# Patient Record
Sex: FEMALE | Race: BLACK OR AFRICAN AMERICAN | NOT HISPANIC OR LATINO | Employment: PART TIME | URBAN - METROPOLITAN AREA
[De-identification: names, ages, dates, MRNs, and addresses within clinical notes are randomized per-mention and may not be internally consistent; named-entity substitution may affect disease eponyms.]

---

## 2021-03-08 PROBLEM — Z34.90 PREGNANCY: Status: ACTIVE | Noted: 2021-03-08

## 2021-03-08 PROBLEM — O23.40 UTI (URINARY TRACT INFECTION) DURING PREGNANCY: Status: ACTIVE | Noted: 2021-03-08

## 2021-03-08 PROBLEM — O21.9 NAUSEA AND VOMITING OF PREGNANCY, ANTEPARTUM: Status: ACTIVE | Noted: 2021-03-08

## 2021-09-23 PROBLEM — Z34.90 PREGNANCY: Status: RESOLVED | Noted: 2021-03-08 | Resolved: 2021-09-23

## 2023-06-29 PROBLEM — R10.9 ACUTE ABDOMINAL PAIN: Status: ACTIVE | Noted: 2023-06-29

## 2023-06-29 PROBLEM — O26.892 ABDOMINAL PAIN IN PREGNANCY, SECOND TRIMESTER: Status: ACTIVE | Noted: 2023-06-29

## 2023-06-29 PROBLEM — K59.00 CONSTIPATION IN PREGNANCY IN SECOND TRIMESTER: Status: ACTIVE | Noted: 2023-06-29

## 2023-06-29 PROBLEM — O99.012 ANEMIA IN PREGNANCY, SECOND TRIMESTER: Status: ACTIVE | Noted: 2023-06-29

## 2023-06-29 PROBLEM — R10.9 ABDOMINAL PAIN IN PREGNANCY, SECOND TRIMESTER: Status: ACTIVE | Noted: 2023-06-29

## 2023-06-29 PROBLEM — O99.612 CONSTIPATION IN PREGNANCY IN SECOND TRIMESTER: Status: ACTIVE | Noted: 2023-06-29

## 2023-07-06 ENCOUNTER — ROUTINE PRENATAL (OUTPATIENT)
Dept: OBSTETRICS AND GYNECOLOGY | Facility: CLINIC | Age: 28
End: 2023-07-06
Payer: COMMERCIAL

## 2023-07-06 VITALS — DIASTOLIC BLOOD PRESSURE: 55 MMHG | BODY MASS INDEX: 30.3 KG/M2 | WEIGHT: 140 LBS | SYSTOLIC BLOOD PRESSURE: 86 MMHG

## 2023-07-06 DIAGNOSIS — Z36.89 ENCOUNTER FOR FETAL ANATOMIC SURVEY: ICD-10-CM

## 2023-07-06 DIAGNOSIS — Z3A.18 18 WEEKS GESTATION OF PREGNANCY: Primary | ICD-10-CM

## 2023-07-06 DIAGNOSIS — Z36.86 ENCOUNTER FOR ANTENATAL SCREENING FOR CERVICAL LENGTH: ICD-10-CM

## 2023-07-06 DIAGNOSIS — K59.00 CONSTIPATION, UNSPECIFIED CONSTIPATION TYPE: ICD-10-CM

## 2023-07-06 DIAGNOSIS — Z34.92 PRENATAL CARE IN SECOND TRIMESTER: ICD-10-CM

## 2023-07-06 DIAGNOSIS — O21.9 NAUSEA AND VOMITING DURING PREGNANCY: ICD-10-CM

## 2023-07-06 PROCEDURE — 99213 OFFICE O/P EST LOW 20 MIN: CPT | Performed by: STUDENT IN AN ORGANIZED HEALTH CARE EDUCATION/TRAINING PROGRAM

## 2023-07-06 RX ORDER — POLYETHYLENE GLYCOL 3350 17 G/17G
17 POWDER, FOR SOLUTION ORAL DAILY PRN
Qty: 30 EACH | Refills: 2 | Status: SHIPPED | OUTPATIENT
Start: 2023-07-06

## 2023-07-06 NOTE — PROGRESS NOTES
Chief Complaint   Patient presents with    Routine Prenatal Visit      Ian Perez is a 28 y.o.  at 18w3d who presents for routine prenatal visit. She reports that she is still having some constipation symptoms like what brought her to the hospital but it has improved a lot. She states last bowel movement was a small amount and she has been more regular. She also has noted improvement in her nausea and vomiting.  Denies vaginal bleeding, cramping, contractions, LOF. She has started to feel some fetal movement.     BP (!) 86/55   Wt 63.5 kg (140 lb)   LMP 2023   BMI 30.30 kg/m²    Gen: well appearing, NAD   Abd: gravid, nontender  See OB Flowsheet    ASSESSMENT:   IUP at 18w3d   2.   Prenatal care in second trimester  3.   Nausea and vomiting of pregnancy- improving  4.   Constipation     PLAN:  Problem list reviewed and updated.   Reviewed expectations of this stage of pregnancy.   Second trimester precautions reviewed including  labor precautions, anticipated fetal movements.   Will obtain anatomy survey and cervical length screening at next  visit.   Prescribed Miralax daily as needed for constipation symptoms.   Return in about 4 weeks (around 8/3/2023) for prenatal visit and anatomy survey .    Patient Active Problem List    Diagnosis Date Noted    Acute abdominal pain 2023    Abdominal pain in pregnancy, second trimester 2023    Constipation in pregnancy in second trimester 2023    Anemia in pregnancy, second trimester 2023    Retained products of conception after delivery without hemorrhage 2022     Note Last Updated: 2022     Added automatically from request for surgery [id number]      Abnormal uterine bleeding, postpartum 2022     Note Last Updated: 2022     Added automatically from request for surgery [id number]      Maternal anemia in pregnancy, antepartum 09/15/2021    UTI (urinary tract infection) during pregnancy 2021    Nausea  and vomiting of pregnancy, antepartum 03/08/2021       Orders Placed This Encounter   Procedures    US Ob 14 + Weeks Single or First Gestation     Standing Status:   Future     Standing Expiration Date:   7/24/2024     Order Specific Question:   Reason for Exam:     Answer:   fetal anatomy survey     Order Specific Question:   Release to patient     Answer:   Routine Release    US Ob Transvaginal     Standing Status:   Future     Standing Expiration Date:   7/24/2024     Order Specific Question:   Reason for Exam:     Answer:   cervical length screening     Order Specific Question:   Release to patient     Answer:   Routine Release     Malorie Tucker MD

## 2023-08-09 ENCOUNTER — ROUTINE PRENATAL (OUTPATIENT)
Dept: OBSTETRICS AND GYNECOLOGY | Facility: CLINIC | Age: 28
End: 2023-08-09
Payer: COMMERCIAL

## 2023-08-09 VITALS — DIASTOLIC BLOOD PRESSURE: 59 MMHG | BODY MASS INDEX: 31.59 KG/M2 | WEIGHT: 146 LBS | SYSTOLIC BLOOD PRESSURE: 93 MMHG

## 2023-08-09 DIAGNOSIS — Z3A.23 23 WEEKS GESTATION OF PREGNANCY: Primary | ICD-10-CM

## 2023-08-09 DIAGNOSIS — Z34.80 SUPERVISION OF OTHER NORMAL PREGNANCY, ANTEPARTUM: ICD-10-CM

## 2023-08-09 DIAGNOSIS — Z34.92 PRENATAL CARE IN SECOND TRIMESTER: ICD-10-CM

## 2023-08-09 LAB
GLUCOSE UR STRIP-MCNC: NEGATIVE MG/DL
PROT UR STRIP-MCNC: ABNORMAL MG/DL

## 2023-08-09 NOTE — PROGRESS NOTES
Chief Complaint   Patient presents with    Routine Prenatal Visit      Ian Perez is a 28 y.o.  at 23w2d who presents of routine prenatal visit. She reports doing well and has no complaints today. Denies vaginal bleeding, cramping, contractions, LOF. She reports feeling active fetal movement.     BP 93/59   Wt 66.2 kg (146 lb)   LMP 2023   BMI 31.59 kg/mý    Gen: well appearing, NAD   Abd: gravid, nontender  See OB Flowsheet    ASSESSMENT:   IUP at 23w2d   Prenatal care in second trimester  Supervision of normal pregnancy    PLAN:  Problem list reviewed and updated.   Reviewed expectations of this stage of pregnancy.   Second trimester precautions reviewed including  labor precautions, anticipated fetal movements.   AFP reviewed, declines.  Discussed ultrasound that demonstrated normal fetal anatomy survey, normal cervical length screening measuring 6.05 cm,  g (52%ile, AC 70%ile), posterior placenta, breech presentation, and MVP 5.77 cm.   Will obtain CBC and 1h GTT at next visit.   Return in about 4 weeks (around 2023) for 1 h GTT, Prenatal visit.    Patient Active Problem List    Diagnosis Date Noted    Acute abdominal pain 2023    Abdominal pain in pregnancy, second trimester 2023    Constipation in pregnancy in second trimester 2023    Anemia in pregnancy, second trimester 2023    Retained products of conception after delivery without hemorrhage 2022     Note Last Updated: 2022     Added automatically from request for surgery [id number]      Abnormal uterine bleeding, postpartum 2022     Note Last Updated: 2022     Added automatically from request for surgery [id number]      Maternal anemia in pregnancy, antepartum 09/15/2021    UTI (urinary tract infection) during pregnancy 2021    Nausea and vomiting of pregnancy, antepartum 2021       Orders Placed This Encounter   Procedures    CBC (No Diff)     Standing Status:    Future     Standing Expiration Date:   8/27/2024     Order Specific Question:   Release to patient     Answer:   Routine Release [[id number]]    Gestational Screen 1 Hr ([facility])     Standing Status:   Future     Standing Expiration Date:   8/27/2024     Order Specific Question:   Release to patient     Answer:   Routine Release [[id number]]    POC Urinalysis Dipstick     Malorie Tucker MD

## 2023-08-21 ENCOUNTER — REFERRAL TRIAGE (OUTPATIENT)
Dept: LABOR AND DELIVERY | Facility: HOSPITAL | Age: 28
End: 2023-08-21
Payer: COMMERCIAL

## 2023-08-28 ENCOUNTER — PATIENT OUTREACH (OUTPATIENT)
Dept: LABOR AND DELIVERY | Facility: HOSPITAL | Age: 28
End: 2023-08-28
Payer: COMMERCIAL

## 2023-08-28 NOTE — OUTREACH NOTE
[facility]  Enrollment    Current Estimated Gestational Age: 26w0d    Questions/Answers      Flowsheet Row Responses   Would like to participate? 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 No   Resources Presently Utilizing: WIC (Women, Infant, Children)   Maternal Warning Signs Provided   Other Education Housing Assistance, Transportation Assistance, WIC Benefits, How to find a pediatrician, How to find a dentist, Insurance benefits/Incentives            Learning Assessment      Flowsheet Row Responses   Relationship Patient   Learner Name Ian   Does the learner have any barriers to learning? No Barriers   What is the preferred language of the learner for medical teaching? English   Is an  required? No  [may be needed for more indepth medical conversations]   How does the learner prefer to learn new concepts? 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 sick today   Supplies ready for baby Car Seat, Crib, Clothing   Resource/Environmental Concerns Financial   Do you have any questions related to your care experience, your pregnancy, plans for delivery, any concerns, etc? No   Other Education Housing Assistance, Transportation Assistance, WIC Benefits, How to find a pediatrician, How to find a dentist, Insurance benefits/Incentives          Intake completed today but pt is not feeling well and sounds  "congested and is coughing during phone call. She lives with her mother and  in an apartment and would like resources for housing as their current apartment \"does not have good heating\". Her mother has a car but she reports that transportation to appts can be difficult between work schedules. She does have WIC but still reports some food insecurity. She would like assistance finding a new peds office closer to their home. She does have many necessary infant items,  just not diapers. Reviewed maternal warning signs. She reports that she is feeling \"some\" fetal movement, definitely not as much today but she also reports that she has not been eating/drinking. Encouraged fluids today, she will monitor fetal movement this evening and go to L&D if indicated or make an appt tomorrow with her PCP if needed. She is not able to get into her My Chart account, will plan to bring a folder of info to her next appt.     Karena Galvin RN  Maternity Nurse Navigator    8/28/2023, 16:43 EDT                        "

## 2023-09-05 ENCOUNTER — ROUTINE PRENATAL (OUTPATIENT)
Dept: OBSTETRICS AND GYNECOLOGY | Facility: CLINIC | Age: 28
End: 2023-09-05

## 2023-09-05 ENCOUNTER — PATIENT OUTREACH (OUTPATIENT)
Dept: LABOR AND DELIVERY | Facility: HOSPITAL | Age: 28
End: 2023-09-05
Payer: COMMERCIAL

## 2023-09-05 VITALS — WEIGHT: 149 LBS | SYSTOLIC BLOOD PRESSURE: 94 MMHG | DIASTOLIC BLOOD PRESSURE: 61 MMHG | BODY MASS INDEX: 32.24 KG/M2

## 2023-09-05 DIAGNOSIS — O36.8120 DECREASED FETAL MOVEMENTS IN SECOND TRIMESTER, SINGLE OR UNSPECIFIED FETUS: ICD-10-CM

## 2023-09-05 DIAGNOSIS — Z34.80 SUPERVISION OF OTHER NORMAL PREGNANCY, ANTEPARTUM: ICD-10-CM

## 2023-09-05 DIAGNOSIS — Z3A.27 27 WEEKS GESTATION OF PREGNANCY: Primary | ICD-10-CM

## 2023-09-05 DIAGNOSIS — Z34.92 PRENATAL CARE, SECOND TRIMESTER: ICD-10-CM

## 2023-09-05 NOTE — PROGRESS NOTES
Chief Complaint   Patient presents with    Routine Prenatal Visit      Ian Perez is a 28 y.o.  at 27w1d who presents of routine prenatal visit. She reports decreased fetal movement for last 3 days. Denies vaginal bleeding, cramping, contractions, LOF.     BP 94/61   Wt 67.6 kg (149 lb)   LMP 2023   BMI 32.24 kg/m²    Gen: well appearing, NAD   Abd: gravid, nontender, fetal movement palpated on exam  See OB Flowsheet    ASSESSMENT:   IUP at 27w1d   Prenatal care in second trimester  Supervision of normal pregnancy  Decreased fetal movement in second trimester     PLAN:  Problem list reviewed and updated.   Reviewed expectations of this stage of pregnancy.   Second trimester precautions reviewed including  labor precautions, anticipated fetal movements.   NST performed today for decreased fetal movement that returned reactive. I could also feel fetal movement on examination of fetal heart rate. The patient was provided reassurance.   Collected 1h GTT and CBC today.   Follow up in 4 weeks for prenatal visit.     Patient Active Problem List    Diagnosis Date Noted    Acute abdominal pain 2023    Abdominal pain in pregnancy, second trimester 2023    Constipation in pregnancy in second trimester 2023    Anemia in pregnancy, second trimester 2023    Retained products of conception after delivery without hemorrhage 2022     Note Last Updated: 2022     Added automatically from request for surgery [id number]      Abnormal uterine bleeding, postpartum 2022     Note Last Updated: 2022     Added automatically from request for surgery [id number]      Maternal anemia in pregnancy, antepartum 09/15/2021    UTI (urinary tract infection) during pregnancy 2021    Nausea and vomiting of pregnancy, antepartum 2021       No orders of the defined types were placed in this encounter.    Malorie Tucker MD

## 2023-09-05 NOTE — OUTREACH NOTE
[facility]  Check-In    Current Estimated Gestational Age: 27w1d      Questions/Answers      Flowsheet Row Responses   Other Education HANDS, Housing Assistance, How to find a dentist, How to find a pediatrician, Insurance benefits/Incentives, Mental Health Services, Transportation Assistance, WIC Benefits          Met pt at office with folder of resources including the Helpline number for My Chart as she has an active account but cannot get in to access info. F/u in two weeks.     Kraena Galvin RN  Maternity Nurse Navigator    9/5/2023, 11:29 EDT

## 2023-09-19 ENCOUNTER — PATIENT OUTREACH (OUTPATIENT)
Dept: LABOR AND DELIVERY | Facility: HOSPITAL | Age: 28
End: 2023-09-19
Payer: COMMERCIAL

## 2023-09-19 NOTE — OUTREACH NOTE
[facility]  Check-In    Current Estimated Gestational Age: 29w1d      Questions/Answers      Flowsheet Row Responses   Best Method for Contacting Cell   Demographics Reviewed Yes   Currently Employed Yes   Able to keep appointments as scheduled Yes   Gender(s) and Name(s) girl   Baby Active/Feeling Fetal Movemen Yes   How are you presently feeling? feeling much better   Supplies ready for baby Car Seat, Crib, Clothing   Resource/Environmental Concerns Financial   Do you have any questions related to your care experience, your pregnancy, plans for delivery, any concerns, etc? No   Other Education WIC Benefits, Insurance benefits/Incentives, Housing Assistance          Pt reports that she is feeling much better and feeling more active fetal movement as well. Still needing assistance with housing and food. Reached out to [facility] because her zip code qualifies her for their food pantry and they will see if they can help with other resources once she is in the system. Send her their hours for this Friday. F/u next month.     Karena Galvin RN  Maternity Nurse Navigator    9/19/2023, 12:19 EDT

## 2023-10-05 ENCOUNTER — ROUTINE PRENATAL (OUTPATIENT)
Dept: OBSTETRICS AND GYNECOLOGY | Facility: CLINIC | Age: 28
End: 2023-10-05
Payer: COMMERCIAL

## 2023-10-05 VITALS — WEIGHT: 154 LBS | DIASTOLIC BLOOD PRESSURE: 62 MMHG | SYSTOLIC BLOOD PRESSURE: 96 MMHG | BODY MASS INDEX: 33.33 KG/M2

## 2023-10-05 DIAGNOSIS — Z3A.31 31 WEEKS GESTATION OF PREGNANCY: Primary | ICD-10-CM

## 2023-10-05 DIAGNOSIS — O26.893 GESTATIONAL DYSPNEA IN THIRD TRIMESTER: ICD-10-CM

## 2023-10-05 DIAGNOSIS — Z34.83 PRENATAL CARE, SUBSEQUENT PREGNANCY, THIRD TRIMESTER: ICD-10-CM

## 2023-10-05 DIAGNOSIS — O99.019 MATERNAL ANEMIA IN PREGNANCY, ANTEPARTUM: ICD-10-CM

## 2023-10-05 DIAGNOSIS — R06.00 GESTATIONAL DYSPNEA IN THIRD TRIMESTER: ICD-10-CM

## 2023-10-05 DIAGNOSIS — R00.2 HEART PALPITATIONS: ICD-10-CM

## 2023-10-05 RX ORDER — FAMOTIDINE 10 MG/ML
20 INJECTION, SOLUTION INTRAVENOUS ONCE
OUTPATIENT
Start: 2023-10-09

## 2023-10-05 RX ORDER — DIPHENHYDRAMINE HYDROCHLORIDE 50 MG/ML
25 INJECTION INTRAMUSCULAR; INTRAVENOUS ONCE
OUTPATIENT
Start: 2023-10-09

## 2023-10-05 RX ORDER — DIPHENHYDRAMINE HYDROCHLORIDE 50 MG/ML
50 INJECTION INTRAMUSCULAR; INTRAVENOUS AS NEEDED
OUTPATIENT
Start: 2023-10-09

## 2023-10-05 RX ORDER — ACETAMINOPHEN 325 MG/1
650 TABLET ORAL ONCE
OUTPATIENT
Start: 2023-10-09

## 2023-10-05 RX ORDER — SODIUM CHLORIDE 9 MG/ML
250 INJECTION, SOLUTION INTRAVENOUS ONCE
OUTPATIENT
Start: 2023-10-09

## 2023-10-05 RX ORDER — DIPHENHYDRAMINE HCL 25 MG
25 CAPSULE ORAL ONCE
OUTPATIENT
Start: 2023-10-09

## 2023-10-05 RX ORDER — FAMOTIDINE 10 MG/ML
20 INJECTION, SOLUTION INTRAVENOUS AS NEEDED
OUTPATIENT
Start: 2023-10-09

## 2023-10-05 RX ORDER — FAMOTIDINE 20 MG/1
20 TABLET, FILM COATED ORAL ONCE
OUTPATIENT
Start: 2023-10-09

## 2023-10-05 NOTE — PROGRESS NOTES
Chief Complaint   Patient presents with    Routine Prenatal Visit      Ian Perez is a 28 y.o.  at 31w3d who presents for routine prenatal visit. She reports that she has been experiencing shortness of breath and racing heart feeling like it is beating out of her chest almost every morning when she wakes up. She has no associated chest pain. The episodes due resolve on their own. The episodes sometimes occur at other times during the day but most of the time its when she wakes up. Denies vaginal bleeding, cramping, contractions, LOF. She reports active fetal movement.     BP 96/62   Wt 69.9 kg (154 lb)   LMP 2023   BMI 33.33 kg/m²    Gen: well appearing, NAD   Pulm: no increased work of breathing, CTAB  Card: normal rate and rhythm without murmurs, rubs, gallops   Abd: gravid, nontender  See OB Flowsheet    ASSESSMENT:   IUP at 31w3d   Prenatal care in third trimester   Maternal anemia affecting pregnancy- Hgb 8.8   Heart palpitations  Gestational dyspnea     PLAN:  Problem list reviewed and updated.   Reviewed expectations of this stage of pregnancy.   Third trimester precautions reviewed including labor signs, monitoring fetal movements.   I discussed that patient's significant anemia that appears to be microcystic and likely iron deficiency anemia. Will obtain ferritin and iron profile to evaluate for iron deficiency anemia. Given high suspicion, I discussed option for iron infusions at infusion center and that patient agrees. Will arrange for IV Venofer 300 mg Q weekly x 3 doses.   Will refer to cardiology for further evaluation of heart palpations and dyspnea. Now, this could be related to her significant anemia or from dyspnea of pregnancy. Exam returned benign today.   Return in about 2 weeks (around 10/19/2023) for Prenatal visit.    Patient Active Problem List    Diagnosis Date Noted    Acute abdominal pain 2023    Abdominal pain in pregnancy, second trimester 2023     Constipation in pregnancy in second trimester 06/29/2023    Anemia in pregnancy, second trimester 06/29/2023    Retained products of conception after delivery without hemorrhage 01/12/2022     Note Last Updated: 1/12/2022     Added automatically from request for surgery [id number]      Abnormal uterine bleeding, postpartum 01/12/2022     Note Last Updated: 1/12/2022     Added automatically from request for surgery [id number]      Maternal anemia in pregnancy, antepartum 09/15/2021    UTI (urinary tract infection) during pregnancy 03/08/2021    Nausea and vomiting of pregnancy, antepartum 03/08/2021       Orders Placed This Encounter   Procedures    Ambulatory Referral to Cardiology     Referral Priority:   Routine     Referral Type:   Consultation     Referral Reason:   Specialty Services Required     Requested Specialty:   Cardiology     Number of Visits Requested:   1    Ambulatory Referral to [facility] For Infusion Treatment     Referral Priority:   Routine     Referral Type:   Infusion     Referral Reason:   Specialty Services Required     Number of Visits Requested:   1    POC Urinalysis Dipstick     Order Specific Question:   Release to patient     Answer:   Routine Release [[id number]]     Malorie Tucker MD

## 2023-10-10 ENCOUNTER — PATIENT OUTREACH (OUTPATIENT)
Dept: LABOR AND DELIVERY | Facility: HOSPITAL | Age: 28
End: 2023-10-10
Payer: COMMERCIAL

## 2023-10-10 ENCOUNTER — TELEPHONE (OUTPATIENT)
Dept: OBSTETRICS AND GYNECOLOGY | Facility: CLINIC | Age: 28
End: 2023-10-10
Payer: COMMERCIAL

## 2023-10-10 ENCOUNTER — OFFICE VISIT (OUTPATIENT)
Age: 28
End: 2023-10-10
Payer: COMMERCIAL

## 2023-10-10 VITALS
BODY MASS INDEX: 33.01 KG/M2 | DIASTOLIC BLOOD PRESSURE: 70 MMHG | OXYGEN SATURATION: 99 % | HEIGHT: 57 IN | WEIGHT: 153 LBS | SYSTOLIC BLOOD PRESSURE: 120 MMHG | HEART RATE: 101 BPM

## 2023-10-10 DIAGNOSIS — O99.019 MATERNAL ANEMIA IN PREGNANCY, ANTEPARTUM: Primary | ICD-10-CM

## 2023-10-10 DIAGNOSIS — R00.2 PALPITATIONS: Primary | ICD-10-CM

## 2023-10-10 PROCEDURE — 1160F RVW MEDS BY RX/DR IN RCRD: CPT | Performed by: INTERNAL MEDICINE

## 2023-10-10 PROCEDURE — 1159F MED LIST DOCD IN RCRD: CPT | Performed by: INTERNAL MEDICINE

## 2023-10-10 PROCEDURE — 93000 ELECTROCARDIOGRAM COMPLETE: CPT | Performed by: INTERNAL MEDICINE

## 2023-10-10 PROCEDURE — 99204 OFFICE O/P NEW MOD 45 MIN: CPT | Performed by: INTERNAL MEDICINE

## 2023-10-10 RX ORDER — DIPHENHYDRAMINE HYDROCHLORIDE 50 MG/ML
25 INJECTION INTRAMUSCULAR; INTRAVENOUS ONCE
Status: CANCELLED | OUTPATIENT
Start: 2023-10-10

## 2023-10-10 RX ORDER — FAMOTIDINE 10 MG/ML
20 INJECTION, SOLUTION INTRAVENOUS ONCE
Status: CANCELLED | OUTPATIENT
Start: 2023-10-10

## 2023-10-10 NOTE — TELEPHONE ENCOUNTER
[facility] Scheduling called needing clarification on Venofer. Do you want medication to be taken 1 pill daily for 3 days or 1 pill weekly for 3 weeks. Scheduling  [phone]

## 2023-10-10 NOTE — OUTREACH NOTE
[facility]  Unable to Reach       Questions/Answers      Flowsheet Row Responses   Pending Outreach Prenatal Check-in   Call Attempt First   Outcome Left message   Next Call Attempt Date 10/11/23              Karena CAR - RN  Maternity Nurse Navigator    10/10/2023, 14:41 EDT

## 2023-10-12 ENCOUNTER — PATIENT OUTREACH (OUTPATIENT)
Dept: LABOR AND DELIVERY | Facility: HOSPITAL | Age: 28
End: 2023-10-12
Payer: COMMERCIAL

## 2023-10-12 NOTE — OUTREACH NOTE
[facility]  Check-In    Current Estimated Gestational Age: 32w3d      Questions/Answers      Flowsheet Row Responses   Best Method for Contacting Cell   Demographics Reviewed Yes   Currently Employed Yes   Able to keep appointments as scheduled Yes   Gender(s) and Name(s) girl   Baby Active/Feeling Fetal Movemen Yes   How are you presently feeling? reports intermittent SOB, report that MD aware, seeing cardiology   Questions regarding prenatal visits or tests to be ordered? No   Supplies ready for baby Car Seat, Clothing, Crib, Diapers   Resource/Environmental Concerns Financial   Do you have any questions related to your care experience, your pregnancy, plans for delivery, any concerns, etc? No   Other Education [facility] Benefits, Insurance benefits/Incentives          Pt doing ok, she does report intermittent SOB but is seeing cardiology and will be stopping work in the next week or two. She did visit [facility] for food assistance and will be returning monthly. They gave her some diapers but I encouraged her to go to [facility] as they can give her more each month, sent info to her. She reports active fetal movement. F/u next month.     Karena Galvin RN  Maternity Nurse Navigator    10/12/2023, 14:49 EDT

## 2023-10-13 ENCOUNTER — HOSPITAL ENCOUNTER (OUTPATIENT)
Dept: INFUSION THERAPY | Facility: HOSPITAL | Age: 28
Discharge: HOME | End: 2023-10-13
Payer: COMMERCIAL

## 2023-10-13 VITALS
SYSTOLIC BLOOD PRESSURE: 105 MMHG | TEMPERATURE: 96.8 F | DIASTOLIC BLOOD PRESSURE: 73 MMHG | RESPIRATION RATE: 18 BRPM | OXYGEN SATURATION: 100 % | HEART RATE: 99 BPM

## 2023-10-13 DIAGNOSIS — O99.019 MATERNAL ANEMIA IN PREGNANCY, ANTEPARTUM: Primary | ICD-10-CM

## 2023-10-13 PROCEDURE — 25010000002 IRON SUCROSE PER 1 MG: Performed by: STUDENT IN AN ORGANIZED HEALTH CARE EDUCATION/TRAINING PROGRAM

## 2023-10-13 PROCEDURE — 96365 THER/PROPH/DIAG IV INF INIT: CPT

## 2023-10-13 PROCEDURE — 96366 THER/PROPH/DIAG IV INF ADDON: CPT

## 2023-10-13 PROCEDURE — 63710000001 DIPHENHYDRAMINE PER 50 MG: Performed by: STUDENT IN AN ORGANIZED HEALTH CARE EDUCATION/TRAINING PROGRAM

## 2023-10-13 PROCEDURE — 25810000003 SODIUM CHLORIDE 0.9 % SOLUTION 250 ML FLEX CONT: Performed by: STUDENT IN AN ORGANIZED HEALTH CARE EDUCATION/TRAINING PROGRAM

## 2023-10-13 RX ORDER — DIPHENHYDRAMINE HCL 25 MG
25 CAPSULE ORAL ONCE
Status: CANCELLED | OUTPATIENT
Start: 2023-10-20

## 2023-10-13 RX ORDER — FAMOTIDINE 20 MG/1
20 TABLET, FILM COATED ORAL ONCE
Status: CANCELLED | OUTPATIENT
Start: 2023-10-20

## 2023-10-13 RX ORDER — DIPHENHYDRAMINE HCL 25 MG
25 CAPSULE ORAL ONCE
Status: COMPLETED | OUTPATIENT
Start: 2023-10-13 | End: 2023-10-13

## 2023-10-13 RX ORDER — DIPHENHYDRAMINE HYDROCHLORIDE 50 MG/ML
25 INJECTION INTRAMUSCULAR; INTRAVENOUS ONCE
OUTPATIENT
Start: 2023-10-20

## 2023-10-13 RX ORDER — FAMOTIDINE 10 MG/ML
20 INJECTION, SOLUTION INTRAVENOUS AS NEEDED
Status: DISCONTINUED | OUTPATIENT
Start: 2023-10-13 | End: 2023-10-15 | Stop reason: HOSPADM

## 2023-10-13 RX ORDER — FAMOTIDINE 10 MG/ML
20 INJECTION, SOLUTION INTRAVENOUS ONCE
OUTPATIENT
Start: 2023-10-20

## 2023-10-13 RX ORDER — ACETAMINOPHEN 325 MG/1
650 TABLET ORAL ONCE
Status: COMPLETED | OUTPATIENT
Start: 2023-10-13 | End: 2023-10-13

## 2023-10-13 RX ORDER — ACETAMINOPHEN 325 MG/1
650 TABLET ORAL ONCE
Status: CANCELLED | OUTPATIENT
Start: 2023-10-20

## 2023-10-13 RX ORDER — SODIUM CHLORIDE 9 MG/ML
250 INJECTION, SOLUTION INTRAVENOUS ONCE
Status: DISCONTINUED | OUTPATIENT
Start: 2023-10-13 | End: 2023-10-15 | Stop reason: HOSPADM

## 2023-10-13 RX ORDER — SODIUM CHLORIDE 9 MG/ML
250 INJECTION, SOLUTION INTRAVENOUS ONCE
Status: CANCELLED | OUTPATIENT
Start: 2023-10-20

## 2023-10-13 RX ORDER — DIPHENHYDRAMINE HYDROCHLORIDE 50 MG/ML
50 INJECTION INTRAMUSCULAR; INTRAVENOUS AS NEEDED
Status: CANCELLED | OUTPATIENT
Start: 2023-10-20

## 2023-10-13 RX ORDER — FAMOTIDINE 20 MG/1
20 TABLET, FILM COATED ORAL ONCE
Status: COMPLETED | OUTPATIENT
Start: 2023-10-13 | End: 2023-10-13

## 2023-10-13 RX ORDER — DIPHENHYDRAMINE HYDROCHLORIDE 50 MG/ML
50 INJECTION INTRAMUSCULAR; INTRAVENOUS AS NEEDED
Status: DISCONTINUED | OUTPATIENT
Start: 2023-10-13 | End: 2023-10-15 | Stop reason: HOSPADM

## 2023-10-13 RX ORDER — FAMOTIDINE 10 MG/ML
20 INJECTION, SOLUTION INTRAVENOUS AS NEEDED
Status: CANCELLED | OUTPATIENT
Start: 2023-10-20

## 2023-10-13 RX ADMIN — DIPHENHYDRAMINE HYDROCHLORIDE 25 MG: 25 CAPSULE ORAL at 11:23

## 2023-10-13 RX ADMIN — IRON SUCROSE 300 MG: 20 INJECTION, SOLUTION INTRAVENOUS at 11:32

## 2023-10-13 RX ADMIN — ACETAMINOPHEN 650 MG: 325 TABLET, FILM COATED ORAL at 11:22

## 2023-10-13 RX ADMIN — FAMOTIDINE 20 MG: 20 TABLET ORAL at 11:23

## 2023-10-20 ENCOUNTER — APPOINTMENT (OUTPATIENT)
Dept: GENERAL RADIOLOGY | Facility: HOSPITAL | Age: 28
End: 2023-10-20
Payer: COMMERCIAL

## 2023-10-20 ENCOUNTER — HOSPITAL ENCOUNTER (OUTPATIENT)
Dept: INFUSION THERAPY | Facility: HOSPITAL | Age: 28
Discharge: HOME | End: 2023-10-20
Payer: COMMERCIAL

## 2023-10-20 ENCOUNTER — APPOINTMENT (OUTPATIENT)
Dept: CT IMAGING | Facility: HOSPITAL | Age: 28
End: 2023-10-20
Payer: COMMERCIAL

## 2023-10-20 ENCOUNTER — HOSPITAL ENCOUNTER (EMERGENCY)
Facility: HOSPITAL | Age: 28
Discharge: HOME | End: 2023-10-20
Attending: STUDENT IN AN ORGANIZED HEALTH CARE EDUCATION/TRAINING PROGRAM
Payer: COMMERCIAL

## 2023-10-20 VITALS
TEMPERATURE: 96.9 F | RESPIRATION RATE: 20 BRPM | DIASTOLIC BLOOD PRESSURE: 56 MMHG | SYSTOLIC BLOOD PRESSURE: 89 MMHG | HEART RATE: 101 BPM | OXYGEN SATURATION: 100 %

## 2023-10-20 VITALS
HEART RATE: 95 BPM | TEMPERATURE: 97.9 F | SYSTOLIC BLOOD PRESSURE: 102 MMHG | WEIGHT: 153 LBS | HEIGHT: 57 IN | BODY MASS INDEX: 33.01 KG/M2 | OXYGEN SATURATION: 96 % | DIASTOLIC BLOOD PRESSURE: 64 MMHG | RESPIRATION RATE: 16 BRPM

## 2023-10-20 DIAGNOSIS — R82.81 PYURIA: ICD-10-CM

## 2023-10-20 DIAGNOSIS — D50.9 IRON DEFICIENCY ANEMIA, UNSPECIFIED IRON DEFICIENCY ANEMIA TYPE: ICD-10-CM

## 2023-10-20 DIAGNOSIS — R07.9 LEFT-SIDED CHEST PAIN: Primary | ICD-10-CM

## 2023-10-20 DIAGNOSIS — O99.019 MATERNAL ANEMIA IN PREGNANCY, ANTEPARTUM: Primary | ICD-10-CM

## 2023-10-20 DIAGNOSIS — N76.0 ACUTE VAGINITIS: ICD-10-CM

## 2023-10-20 LAB
ABO GROUP BLD: NORMAL
ALBUMIN SERPL-MCNC: 2.9 G/DL (ref 3.5–5.2)
ALBUMIN/GLOB SERPL: 1.1 G/DL
ALP SERPL-CCNC: 86 U/L (ref 39–117)
ALT SERPL W P-5'-P-CCNC: 5 U/L (ref 1–33)
ANION GAP SERPL CALCULATED.3IONS-SCNC: 6.8 MMOL/L (ref 5–15)
APTT PPP: 27.1 SECONDS (ref 22.7–35.4)
AST SERPL-CCNC: 10 U/L (ref 1–32)
BACTERIA UR QL AUTO: ABNORMAL /HPF
BASOPHILS # BLD AUTO: 0.01 10*3/MM3 (ref 0–0.2)
BASOPHILS NFR BLD AUTO: 0.2 % (ref 0–1.5)
BILIRUB SERPL-MCNC: 0.3 MG/DL (ref 0–1.2)
BILIRUB UR QL STRIP: NEGATIVE
BLD GP AB SCN SERPL QL: NEGATIVE
BUN SERPL-MCNC: 3 MG/DL (ref 6–20)
BUN/CREAT SERPL: 10.3 (ref 7–25)
CALCIUM SPEC-SCNC: 8.1 MG/DL (ref 8.6–10.5)
CHLORIDE SERPL-SCNC: 109 MMOL/L (ref 98–107)
CLARITY UR: CLEAR
CLUE CELLS SPEC QL WET PREP: ABNORMAL
CO2 SERPL-SCNC: 20.2 MMOL/L (ref 22–29)
COLOR UR: YELLOW
CREAT SERPL-MCNC: 0.29 MG/DL (ref 0.57–1)
DEPRECATED RDW RBC AUTO: 43.1 FL (ref 37–54)
EGFRCR SERPLBLD CKD-EPI 2021: 149.6 ML/MIN/1.73
EOSINOPHIL # BLD AUTO: 0.12 10*3/MM3 (ref 0–0.4)
EOSINOPHIL NFR BLD AUTO: 1.9 % (ref 0.3–6.2)
ERYTHROCYTE [DISTWIDTH] IN BLOOD BY AUTOMATED COUNT: 15.3 % (ref 12.3–15.4)
GLOBULIN UR ELPH-MCNC: 2.7 GM/DL
GLUCOSE SERPL-MCNC: 101 MG/DL (ref 65–99)
GLUCOSE UR STRIP-MCNC: NEGATIVE MG/DL
HCT VFR BLD AUTO: 26 % (ref 34–46.6)
HGB BLD-MCNC: 8.5 G/DL (ref 12–15.9)
HGB UR QL STRIP.AUTO: NEGATIVE
HYALINE CASTS UR QL AUTO: ABNORMAL /LPF
HYDATID CYST SPEC WET PREP: ABNORMAL
IMM GRANULOCYTES # BLD AUTO: 0.16 10*3/MM3 (ref 0–0.05)
IMM GRANULOCYTES NFR BLD AUTO: 2.5 % (ref 0–0.5)
INR PPP: 1.05 (ref 0.9–1.1)
KETONES UR QL STRIP: NEGATIVE
KOH PREP NAIL: NORMAL
LEUKOCYTE ESTERASE UR QL STRIP.AUTO: ABNORMAL
LYMPHOCYTES # BLD AUTO: 1.45 10*3/MM3 (ref 0.7–3.1)
LYMPHOCYTES NFR BLD AUTO: 22.7 % (ref 19.6–45.3)
MCH RBC QN AUTO: 26.5 PG (ref 26.6–33)
MCHC RBC AUTO-ENTMCNC: 32.7 G/DL (ref 31.5–35.7)
MCV RBC AUTO: 81 FL (ref 79–97)
MONOCYTES # BLD AUTO: 0.55 10*3/MM3 (ref 0.1–0.9)
MONOCYTES NFR BLD AUTO: 8.6 % (ref 5–12)
NEUTROPHILS NFR BLD AUTO: 4.1 10*3/MM3 (ref 1.7–7)
NEUTROPHILS NFR BLD AUTO: 64.1 % (ref 42.7–76)
NITRITE UR QL STRIP: NEGATIVE
NRBC BLD AUTO-RTO: 0.2 /100 WBC (ref 0–0.2)
PH UR STRIP.AUTO: 7.5 [PH] (ref 5–8)
PLATELET # BLD AUTO: 175 10*3/MM3 (ref 140–450)
PMV BLD AUTO: 11 FL (ref 6–12)
POTASSIUM SERPL-SCNC: 3.9 MMOL/L (ref 3.5–5.2)
PROT SERPL-MCNC: 5.6 G/DL (ref 6–8.5)
PROT UR QL STRIP: NEGATIVE
PROTHROMBIN TIME: 13.8 SECONDS (ref 11.7–14.2)
QT INTERVAL: 333 MS
QTC INTERVAL: 399 MS
RBC # BLD AUTO: 3.21 10*6/MM3 (ref 3.77–5.28)
RBC # UR STRIP: ABNORMAL /HPF
REF LAB TEST METHOD: ABNORMAL
RH BLD: POSITIVE
SODIUM SERPL-SCNC: 136 MMOL/L (ref 136–145)
SP GR UR STRIP: <1.005 (ref 1–1.03)
SQUAMOUS #/AREA URNS HPF: ABNORMAL /HPF
T VAGINALIS SPEC QL WET PREP: ABNORMAL
T&S EXPIRATION DATE: NORMAL
TROPONIN T SERPL HS-MCNC: <6 NG/L
UROBILINOGEN UR QL STRIP: ABNORMAL
WBC # UR STRIP: ABNORMAL /HPF
WBC NRBC COR # BLD: 6.39 10*3/MM3 (ref 3.4–10.8)
WBC SPEC QL WET PREP: ABNORMAL
YEAST GENITAL QL WET PREP: ABNORMAL

## 2023-10-20 PROCEDURE — 25810000003 SODIUM CHLORIDE 0.9 % SOLUTION 250 ML FLEX CONT: Performed by: STUDENT IN AN ORGANIZED HEALTH CARE EDUCATION/TRAINING PROGRAM

## 2023-10-20 PROCEDURE — 93010 ELECTROCARDIOGRAM REPORT: CPT | Performed by: INTERNAL MEDICINE

## 2023-10-20 PROCEDURE — 25010000002 IRON SUCROSE PER 1 MG: Performed by: STUDENT IN AN ORGANIZED HEALTH CARE EDUCATION/TRAINING PROGRAM

## 2023-10-20 PROCEDURE — 71275 CT ANGIOGRAPHY CHEST: CPT

## 2023-10-20 PROCEDURE — 99285 EMERGENCY DEPT VISIT HI MDM: CPT

## 2023-10-20 PROCEDURE — 93005 ELECTROCARDIOGRAM TRACING: CPT | Performed by: STUDENT IN AN ORGANIZED HEALTH CARE EDUCATION/TRAINING PROGRAM

## 2023-10-20 PROCEDURE — 80053 COMPREHEN METABOLIC PANEL: CPT | Performed by: STUDENT IN AN ORGANIZED HEALTH CARE EDUCATION/TRAINING PROGRAM

## 2023-10-20 PROCEDURE — 85025 COMPLETE CBC W/AUTO DIFF WBC: CPT | Performed by: STUDENT IN AN ORGANIZED HEALTH CARE EDUCATION/TRAINING PROGRAM

## 2023-10-20 PROCEDURE — 84484 ASSAY OF TROPONIN QUANT: CPT | Performed by: STUDENT IN AN ORGANIZED HEALTH CARE EDUCATION/TRAINING PROGRAM

## 2023-10-20 PROCEDURE — 96366 THER/PROPH/DIAG IV INF ADDON: CPT

## 2023-10-20 PROCEDURE — 86901 BLOOD TYPING SEROLOGIC RH(D): CPT | Performed by: STUDENT IN AN ORGANIZED HEALTH CARE EDUCATION/TRAINING PROGRAM

## 2023-10-20 PROCEDURE — 85610 PROTHROMBIN TIME: CPT | Performed by: STUDENT IN AN ORGANIZED HEALTH CARE EDUCATION/TRAINING PROGRAM

## 2023-10-20 PROCEDURE — 87491 CHLMYD TRACH DNA AMP PROBE: CPT | Performed by: STUDENT IN AN ORGANIZED HEALTH CARE EDUCATION/TRAINING PROGRAM

## 2023-10-20 PROCEDURE — 87220 TISSUE EXAM FOR FUNGI: CPT | Performed by: STUDENT IN AN ORGANIZED HEALTH CARE EDUCATION/TRAINING PROGRAM

## 2023-10-20 PROCEDURE — 63710000001 DIPHENHYDRAMINE PER 50 MG: Performed by: STUDENT IN AN ORGANIZED HEALTH CARE EDUCATION/TRAINING PROGRAM

## 2023-10-20 PROCEDURE — 85730 THROMBOPLASTIN TIME PARTIAL: CPT | Performed by: STUDENT IN AN ORGANIZED HEALTH CARE EDUCATION/TRAINING PROGRAM

## 2023-10-20 PROCEDURE — 25510000001 IOPAMIDOL PER 1 ML: Performed by: STUDENT IN AN ORGANIZED HEALTH CARE EDUCATION/TRAINING PROGRAM

## 2023-10-20 PROCEDURE — 86900 BLOOD TYPING SEROLOGIC ABO: CPT | Performed by: STUDENT IN AN ORGANIZED HEALTH CARE EDUCATION/TRAINING PROGRAM

## 2023-10-20 PROCEDURE — 81001 URINALYSIS AUTO W/SCOPE: CPT | Performed by: STUDENT IN AN ORGANIZED HEALTH CARE EDUCATION/TRAINING PROGRAM

## 2023-10-20 PROCEDURE — 87591 N.GONORRHOEAE DNA AMP PROB: CPT | Performed by: STUDENT IN AN ORGANIZED HEALTH CARE EDUCATION/TRAINING PROGRAM

## 2023-10-20 PROCEDURE — 96365 THER/PROPH/DIAG IV INF INIT: CPT

## 2023-10-20 PROCEDURE — G0463 HOSPITAL OUTPT CLINIC VISIT: HCPCS

## 2023-10-20 PROCEDURE — 87210 SMEAR WET MOUNT SALINE/INK: CPT | Performed by: STUDENT IN AN ORGANIZED HEALTH CARE EDUCATION/TRAINING PROGRAM

## 2023-10-20 PROCEDURE — 86850 RBC ANTIBODY SCREEN: CPT | Performed by: STUDENT IN AN ORGANIZED HEALTH CARE EDUCATION/TRAINING PROGRAM

## 2023-10-20 RX ORDER — ACETAMINOPHEN 325 MG/1
650 TABLET ORAL ONCE
Status: COMPLETED | OUTPATIENT
Start: 2023-10-20 | End: 2023-10-20

## 2023-10-20 RX ORDER — FAMOTIDINE 20 MG/1
20 TABLET, FILM COATED ORAL ONCE
Status: COMPLETED | OUTPATIENT
Start: 2023-10-20 | End: 2023-10-20

## 2023-10-20 RX ORDER — ACETAMINOPHEN 325 MG/1
650 TABLET ORAL ONCE
Status: CANCELLED | OUTPATIENT
Start: 2023-10-27

## 2023-10-20 RX ORDER — FAMOTIDINE 20 MG/1
20 TABLET, FILM COATED ORAL ONCE
Status: CANCELLED | OUTPATIENT
Start: 2023-10-27

## 2023-10-20 RX ORDER — CEPHALEXIN 500 MG/1
500 CAPSULE ORAL 2 TIMES DAILY
Qty: 10 CAPSULE | Refills: 0 | Status: SHIPPED | OUTPATIENT
Start: 2023-10-20

## 2023-10-20 RX ORDER — DIPHENHYDRAMINE HYDROCHLORIDE 50 MG/ML
25 INJECTION INTRAMUSCULAR; INTRAVENOUS ONCE
Status: CANCELLED | OUTPATIENT
Start: 2023-10-27

## 2023-10-20 RX ORDER — DIPHENHYDRAMINE HCL 25 MG
25 CAPSULE ORAL ONCE
Status: CANCELLED | OUTPATIENT
Start: 2023-10-27

## 2023-10-20 RX ORDER — LIDOCAINE 50 MG/G
1 PATCH TOPICAL EVERY 24 HOURS
Qty: 30 EACH | Refills: 0 | Status: SHIPPED | OUTPATIENT
Start: 2023-10-20

## 2023-10-20 RX ORDER — SODIUM CHLORIDE 9 MG/ML
250 INJECTION, SOLUTION INTRAVENOUS ONCE
OUTPATIENT
Start: 2023-10-27

## 2023-10-20 RX ORDER — DIPHENHYDRAMINE HCL 25 MG
25 CAPSULE ORAL ONCE
Status: COMPLETED | OUTPATIENT
Start: 2023-10-20 | End: 2023-10-20

## 2023-10-20 RX ORDER — FAMOTIDINE 10 MG/ML
20 INJECTION, SOLUTION INTRAVENOUS AS NEEDED
Status: CANCELLED | OUTPATIENT
Start: 2023-10-27

## 2023-10-20 RX ORDER — CEPHALEXIN 500 MG/1
500 CAPSULE ORAL ONCE
Status: COMPLETED | OUTPATIENT
Start: 2023-10-20 | End: 2023-10-20

## 2023-10-20 RX ORDER — FAMOTIDINE 10 MG/ML
20 INJECTION, SOLUTION INTRAVENOUS ONCE
Status: CANCELLED | OUTPATIENT
Start: 2023-10-27

## 2023-10-20 RX ORDER — LIDOCAINE 50 MG/G
1 PATCH TOPICAL
Status: DISCONTINUED | OUTPATIENT
Start: 2023-10-21 | End: 2023-10-20

## 2023-10-20 RX ORDER — SODIUM CHLORIDE 9 MG/ML
250 INJECTION, SOLUTION INTRAVENOUS ONCE
Status: DISCONTINUED | OUTPATIENT
Start: 2023-10-20 | End: 2023-10-22 | Stop reason: HOSPADM

## 2023-10-20 RX ORDER — ACETAMINOPHEN 500 MG
1000 TABLET ORAL ONCE
Status: DISCONTINUED | OUTPATIENT
Start: 2023-10-20 | End: 2023-10-21 | Stop reason: HOSPADM

## 2023-10-20 RX ORDER — ACETAMINOPHEN 500 MG
500 TABLET ORAL EVERY 6 HOURS PRN
Qty: 50 TABLET | Refills: 0 | Status: SHIPPED | OUTPATIENT
Start: 2023-10-20

## 2023-10-20 RX ORDER — DIPHENHYDRAMINE HYDROCHLORIDE 50 MG/ML
50 INJECTION INTRAMUSCULAR; INTRAVENOUS AS NEEDED
Status: CANCELLED | OUTPATIENT
Start: 2023-10-27

## 2023-10-20 RX ORDER — DIPHENHYDRAMINE HYDROCHLORIDE 50 MG/ML
50 INJECTION INTRAMUSCULAR; INTRAVENOUS AS NEEDED
Status: DISCONTINUED | OUTPATIENT
Start: 2023-10-20 | End: 2023-10-22 | Stop reason: HOSPADM

## 2023-10-20 RX ORDER — LIDOCAINE 50 MG/G
1 PATCH TOPICAL
Status: DISCONTINUED | OUTPATIENT
Start: 2023-10-20 | End: 2023-10-21 | Stop reason: HOSPADM

## 2023-10-20 RX ORDER — FAMOTIDINE 10 MG/ML
20 INJECTION, SOLUTION INTRAVENOUS AS NEEDED
Status: DISCONTINUED | OUTPATIENT
Start: 2023-10-20 | End: 2023-10-22 | Stop reason: HOSPADM

## 2023-10-20 RX ADMIN — IOPAMIDOL 95 ML: 755 INJECTION, SOLUTION INTRAVENOUS at 18:26

## 2023-10-20 RX ADMIN — FAMOTIDINE 20 MG: 20 TABLET, FILM COATED ORAL at 14:04

## 2023-10-20 RX ADMIN — DIPHENHYDRAMINE HYDROCHLORIDE 25 MG: 25 CAPSULE ORAL at 14:04

## 2023-10-20 RX ADMIN — CEPHALEXIN 500 MG: 500 CAPSULE ORAL at 21:02

## 2023-10-20 RX ADMIN — ACETAMINOPHEN 650 MG: 325 TABLET, FILM COATED ORAL at 14:04

## 2023-10-20 RX ADMIN — IRON SUCROSE 300 MG: 20 INJECTION, SOLUTION INTRAVENOUS at 21:05

## 2023-10-20 NOTE — PROGRESS NOTES
Pt to ACU. When asked if she tolerated Venofer last week, she states she had nausea and a headache for 2 day after. PT is noted to be wincing frequently and states she has been having low back pain for 2 weeks. Repositioned in bed to lay on left side and pt had episode of SOA that recovered quickly. Pt crying.  States she has been having these episode frequently for 1-2 months and saw a cardiologist last week. Had a holter monitor done last week and is scheduled for an echo next week. Pre-meds given. Chart continued to be reviewed with pt and pt continued to look lethargic and crying at times. Asked pt if she would like nurse to call MD and she states yes. Dr Eason on call for Dr Tucker. Dr Eason wants to hold venofer and want pt to be evaluated in ED to rule out PE or CV incident. ED triage called and report given to Bianca. Pt taken to ED per joyce and placed in ED bay per Bianca.

## 2023-10-20 NOTE — ED TRIAGE NOTES
Pt was in ACU.  She was getting her 2nd venofer (she did not get dose).  She is 33 weeks pregnant.  She has been soa x 1-2 months.  She has been seen by cardiology - she has worn a holter that showed episodes of tachycardia.  She has an echo next week.      She was already given tylenol, benadryl and pepcid as premeds in ACU

## 2023-10-21 NOTE — ED PROVIDER NOTES
EMERGENCY DEPARTMENT ENCOUNTER    Room Number:  26/26  PCP: Provider, No Known  History obtained from: Patient      HPI:  Chief Complaint: Left chest pain  A complete HPI/ROS/PMH/PSH/SH/FH are unobtainable due to: Not applicable  Context: Ian Perez is a 28 y.o. female who presents to the ED c/o left-sided chest pain.  Ongoing for the last several days.  Worse with breathing and movement.  Currently pregnant.  No other recent illness, fever, chills.  Scheduled for iron infusion today but was transferred to the ER for further evaluation of her chest pain.  Does have some vaginal discharge and pain.            PAST MEDICAL HISTORY  Active Ambulatory Problems     Diagnosis Date Noted    UTI (urinary tract infection) during pregnancy 03/08/2021    Nausea and vomiting of pregnancy, antepartum 03/08/2021    Maternal anemia in pregnancy, antepartum 09/15/2021    Retained products of conception after delivery without hemorrhage 01/12/2022    Abnormal uterine bleeding, postpartum 01/12/2022    Acute abdominal pain 06/29/2023    Abdominal pain in pregnancy, second trimester 06/29/2023    Constipation in pregnancy in second trimester 06/29/2023    Anemia in pregnancy, second trimester 06/29/2023    Palpitations 10/10/2023     Resolved Ambulatory Problems     Diagnosis Date Noted    Pregnancy 03/08/2021     Past Medical History:   Diagnosis Date    Anemia          PAST SURGICAL HISTORY  No past surgical history on file.      FAMILY HISTORY  No family history on file.      SOCIAL HISTORY  Social History     Socioeconomic History    Marital status:      Spouse name: Tru Rose    Number of children: 1    Highest education level: Some college, no degree   Tobacco Use    Smoking status: Never     Passive exposure: Never    Smokeless tobacco: Never   Vaping Use    Vaping Use: Never used   Substance and Sexual Activity    Alcohol use: Not Currently    Drug use: Never    Sexual activity: Yes     Partners: Male          ALLERGIES  Patient has no known allergies.        REVIEW OF SYSTEMS    As per HPI      PHYSICAL EXAM  ED Triage Vitals   Temp Heart Rate Resp BP SpO2   10/20/23 1450 10/20/23 1450 10/20/23 1450 10/20/23 1519 10/20/23 1450   97.9 °F (36.6 °C) 105 16 (!) 85/64 96 %      Temp src Heart Rate Source Patient Position BP Location FiO2 (%)   10/20/23 1450 10/20/23 1450 10/20/23 1519 10/20/23 1519 --   Tympanic Monitor Lying Right arm        Physical Exam  Constitutional:       General: She is not in acute distress.  HENT:      Head: Normocephalic and atraumatic.   Cardiovascular:      Rate and Rhythm: Regular rhythm. Tachycardia present.   Pulmonary:      Effort: Pulmonary effort is normal. No respiratory distress.   Abdominal:      General: There is no distension.      Palpations: Abdomen is soft.      Tenderness: There is no abdominal tenderness.   Genitourinary:     General: Normal vulva.      Vagina: Vaginal discharge present.      Comments: Scant white discharge  Musculoskeletal:         General: No swelling or deformity.   Skin:     General: Skin is warm and dry.   Neurological:      Mental Status: She is alert. Mental status is at baseline.           Vital signs and nursing notes reviewed.          LAB RESULTS  Recent Results (from the past 24 hour(s))   Comprehensive Metabolic Panel    Collection Time: 10/20/23  3:26 PM    Specimen: Blood   Result Value Ref Range    Glucose 101 (H) 65 - 99 mg/dL    BUN 3 (L) 6 - 20 mg/dL    Creatinine 0.29 (L) 0.57 - 1.00 mg/dL    Sodium 136 136 - 145 mmol/L    Potassium 3.9 3.5 - 5.2 mmol/L    Chloride 109 (H) 98 - 107 mmol/L    CO2 20.2 (L) 22.0 - 29.0 mmol/L    Calcium 8.1 (L) 8.6 - 10.5 mg/dL    Total Protein 5.6 (L) 6.0 - 8.5 g/dL    Albumin 2.9 (L) 3.5 - 5.2 g/dL    ALT (SGPT) 5 1 - 33 U/L    AST (SGOT) 10 1 - 32 U/L    Alkaline Phosphatase 86 39 - 117 U/L    Total Bilirubin 0.3 0.0 - 1.2 mg/dL    Globulin 2.7 gm/dL    A/G Ratio 1.1 g/dL    BUN/Creatinine Ratio 10.3  7.0 - 25.0    Anion Gap 6.8 5.0 - 15.0 mmol/L    eGFR 149.6 >60.0 mL/min/1.73   Protime-INR    Collection Time: 10/20/23  3:26 PM    Specimen: Blood   Result Value Ref Range    Protime 13.8 11.7 - 14.2 Seconds    INR 1.05 0.90 - 1.10   aPTT    Collection Time: 10/20/23  3:26 PM    Specimen: Blood   Result Value Ref Range    PTT 27.1 22.7 - 35.4 seconds   Type & Screen    Collection Time: 10/20/23  3:26 PM    Specimen: Blood   Result Value Ref Range    ABO Type A     RH type Positive     Antibody Screen Negative     T&S Expiration Date 10/23/2023 11:59:59 PM    Single High Sensitivity Troponin T    Collection Time: 10/20/23  3:26 PM    Specimen: Blood   Result Value Ref Range    HS Troponin T <6 <10 ng/L   CBC Auto Differential    Collection Time: 10/20/23  3:26 PM    Specimen: Blood   Result Value Ref Range    WBC 6.39 3.40 - 10.80 10*3/mm3    RBC 3.21 (L) 3.77 - 5.28 10*6/mm3    Hemoglobin 8.5 (L) 12.0 - 15.9 g/dL    Hematocrit 26.0 (L) 34.0 - 46.6 %    MCV 81.0 79.0 - 97.0 fL    MCH 26.5 (L) 26.6 - 33.0 pg    MCHC 32.7 31.5 - 35.7 g/dL    RDW 15.3 12.3 - 15.4 %    RDW-SD 43.1 37.0 - 54.0 fl    MPV 11.0 6.0 - 12.0 fL    Platelets 175 140 - 450 10*3/mm3    Neutrophil % 64.1 42.7 - 76.0 %    Lymphocyte % 22.7 19.6 - 45.3 %    Monocyte % 8.6 5.0 - 12.0 %    Eosinophil % 1.9 0.3 - 6.2 %    Basophil % 0.2 0.0 - 1.5 %    Immature Grans % 2.5 (H) 0.0 - 0.5 %    Neutrophils, Absolute 4.10 1.70 - 7.00 10*3/mm3    Lymphocytes, Absolute 1.45 0.70 - 3.10 10*3/mm3    Monocytes, Absolute 0.55 0.10 - 0.90 10*3/mm3    Eosinophils, Absolute 0.12 0.00 - 0.40 10*3/mm3    Basophils, Absolute 0.01 0.00 - 0.20 10*3/mm3    Immature Grans, Absolute 0.16 (H) 0.00 - 0.05 10*3/mm3    nRBC 0.2 0.0 - 0.2 /100 WBC   ECG 12 Lead Chest Pain    Collection Time: 10/20/23  3:37 PM   Result Value Ref Range    QT Interval 333 ms    QTC Interval 399 ms   Urinalysis With Culture If Indicated - Urine, Clean Catch    Collection Time: 10/20/23  4:57 PM     Specimen: Urine, Clean Catch   Result Value Ref Range    Color, UA Yellow Yellow, Straw    Appearance, UA Clear Clear    pH, UA 7.5 5.0 - 8.0    Specific Gravity, UA <1.005 (L) 1.005 - 1.030    Glucose, UA Negative Negative    Ketones, UA Negative Negative    Bilirubin, UA Negative Negative    Blood, UA Negative Negative    Protein, UA Negative Negative    Leuk Esterase, UA Moderate (2+) (A) Negative    Nitrite, UA Negative Negative    Urobilinogen, UA 0.2 E.U./dL 0.2 - 1.0 E.U./dL   Urinalysis, Microscopic Only - Urine, Clean Catch    Collection Time: 10/20/23  4:57 PM    Specimen: Urine, Clean Catch   Result Value Ref Range    RBC, UA 0-2 None Seen, 0-2 /HPF    WBC, UA 3-5 (A) None Seen, 0-2 /HPF    Bacteria, UA 1+ (A) None Seen /HPF    Squamous Epithelial Cells, UA 7-12 (A) None Seen, 0-2 /HPF    Hyaline Casts, UA None Seen None Seen /LPF    Methodology Manual Light Microscopy    Wet Prep, Genital - Swab, Vagina    Collection Time: 10/20/23  7:08 PM    Specimen: Vagina; Swab   Result Value Ref Range    YEAST No yeast seen No yeast seen    HYPHAL ELEMENTS No Hyphal elements seen No Hyphal elements seen    WBC'S 2+ WBC's seen (A) No WBC's seen    Clue Cells, Wet Prep No Clue cells seen No Clue cells seen    Trichomonas, Wet Prep No Trichomonas seen No Trichomonas seen   KOH Prep - Swab, Cervix    Collection Time: 10/20/23  7:08 PM    Specimen: Cervix; Swab   Result Value Ref Range    KOH Prep No yeast or hyphal elements seen No yeast or hyphal elements seen       Ordered the above labs and reviewed the results.        RADIOLOGY  CT Angiogram Chest Pulmonary Embolism    Result Date: 10/20/2023  CT ANGIOGRAM CHEST PULMONARY EMBOLISM-  INDICATIONS: Short of breath  TECHNIQUE: Radiation dose reduction techniques were utilized, including automated exposure control and exposure modulation based on body size. CT angiography of the chest. Three-dimensional reconstructions  COMPARISON: None available  FINDINGS:  No  pulmonary embolism. No aortic dissection.  The heart size is enlarged without pericardial effusion. A few small subcentimeter short axis mediastinal lymph nodes are seen that are not significant by size criteria.  The airways appear clear.  No pleural effusion or pneumothorax.  The lungs show no focal pulmonary consolidation or mass. A right lower lobe pleural-based nodule on axial image 40 measures 7 mm, advise follow-up chest CT in 3-6 months to characterize change. A 2 mm right middle lobe nodule is apparent on image 53. A pleural-based 3 mm right upper lobe nodule seen on image 43. Small atelectasis is present.  Upper abdominal structures appear unremarkable.   No acute fracture is identified.       No pulmonary embolism. Cardiomegaly. Small pulmonary nodules, follow-up advised.  This report was finalized on 10/20/2023 6:47 PM by Dr. Ortiz Madrigal M.D on Workstation: [id number]       Ordered the above noted radiological studies. Reviewed by me in PACS.            MEDICATIONS GIVEN IN ER  Medications   iron sucrose (VENOFER) 300 mg in sodium chloride 0.9 % 250 mL IVPB (300 mg Intravenous New Bag 10/20/23 2105)   acetaminophen (TYLENOL) tablet 1,000 mg (1,000 mg Oral Not Given 10/20/23 2101)   lidocaine (LIDODERM) 5 % 1 patch (1 patch Transdermal Not Given 10/20/23 2102)   iopamidol (ISOVUE-370) 76 % injection 100 mL (95 mL Intravenous Given by Other 10/20/23 1826)   cephalexin (KEFLEX) capsule 500 mg (500 mg Oral Given 10/20/23 2102)               MEDICAL DECISION MAKING, PROGRESS, and CONSULTS    MDM: Patient presented emergency department with chest pain, pregnancy.  Tachycardic on arrival.  Concern for possible pulmonary embolism.  Otherwise well-appearing, vitals otherwise stable.  Labs otherwise reassuring.  Imaging mistreating no evidence of pulmonary process or pulmonary embolism.  Suspect chest wall pain.  Vaginal exam showing discharge, also pyuria on urinalysis, will place on Keflex.  STI swabs  pending however unlikely to show anything.  We will plan to treat and discharged with close OBGYN follow-up.  Given return precautions and discharged home.    All labs have been independently reviewed by me.  All radiology studies have been reviewed by me and I have also reviewed the radiology report.   EKG's independently viewed and interpreted by me.  Discussion below represents my analysis of pertinent findings related to patient's condition, differential diagnosis, treatment plan and final disposition.      Additional sources:  - Discussed/ obtained information from independent historians:      - External (non-ED) record review: Reviewed chart from today's visit, plan for iron sucrose infusion.    - Chronic or social conditions impacting care: Pregnancy and anemia affect today's visit.    - Shared decision making: Discussed plan for symptomatic treatment and outpatient follow-up, patient agrees      Orders placed during this visit:  Orders Placed This Encounter   Procedures    Wet Prep, Genital - Swab, Vagina    MONSTER Prep - Swab, Cervix    Chlamydia trachomatis, Neisseria gonorrhoeae, PCR - Swab, Cervix    CT Angiogram Chest Pulmonary Embolism    Comprehensive Metabolic Panel    Protime-INR    aPTT    Urinalysis With Culture If Indicated - Urine, Clean Catch    Single High Sensitivity Troponin T    CBC Auto Differential    Urinalysis, Microscopic Only - Urine, Clean Catch    ECG 12 Lead Chest Pain    Type & Screen    CBC & Differential         Additional orders considered but not ordered:  Considered chest x-ray however proceeded to CT.        Differential diagnosis includes but is not limited to:     Pulmonary embolism, chest wall pain, myocardial infarction, Myocarditis, peripartum cardiomyopathy.      Independent interpretation of labs, radiology studies, and discussions with consultants:  ED Course as of 10/20/23 2110   Fri Oct 20, 2023   1641 EKG interpreted myself:  1537, sinus rhythm rate of 86,  nonspecific T wave inversion in 3, V1, V2 possibly represents normal variant.  T wave flattening in V3 through V6.   [FS]      ED Course User Index  [FS] Nelson Blanton MD           DIAGNOSIS  Final diagnoses:   Left-sided chest pain   Iron deficiency anemia, unspecified iron deficiency anemia type   Acute vaginitis   Pyuria         DISPOSITION  Discharged home        Latest Documented Vital Signs:  As of 21:10 EDT  BP- 100/60 HR- 99 Temp- 97.9 °F (36.6 °C) (Tympanic) O2 sat- 96%              --    Please note that portions of this were completed with a voice recognition program.       Note Disclaimer: At [facility], we believe that sharing information builds trust and better relationships. You are receiving this note because you are receiving care at [facility] or recently visited. It is possible you will see health information before a provider has talked with you about it. This kind of information can be easy to misunderstand. To help you fully understand what it means for your health, we urge you to discuss this note with your provider.             Nelson Blanton MD  10/20/23 0548

## 2023-10-23 LAB
C TRACH RRNA SPEC QL NAA+PROBE: NEGATIVE
N GONORRHOEA RRNA SPEC QL NAA+PROBE: NEGATIVE

## 2023-10-24 ENCOUNTER — HOSPITAL ENCOUNTER (OUTPATIENT)
Dept: CARDIOLOGY | Facility: HOSPITAL | Age: 28
Discharge: HOME | End: 2023-10-24
Admitting: INTERNAL MEDICINE
Payer: COMMERCIAL

## 2023-10-24 VITALS
WEIGHT: 153 LBS | DIASTOLIC BLOOD PRESSURE: 62 MMHG | SYSTOLIC BLOOD PRESSURE: 110 MMHG | HEART RATE: 110 BPM | HEIGHT: 60 IN | BODY MASS INDEX: 30.04 KG/M2

## 2023-10-24 DIAGNOSIS — R00.2 PALPITATIONS: ICD-10-CM

## 2023-10-24 LAB
AORTIC ARCH: 2.1 CM
ASCENDING AORTA: 2.3 CM
BH CV ECHO MEAS - ACS: 1.84 CM
BH CV ECHO MEAS - AO MAX PG: 13.8 MMHG
BH CV ECHO MEAS - AO MEAN PG: 8 MMHG
BH CV ECHO MEAS - AO ROOT DIAM: 2.7 CM
BH CV ECHO MEAS - AO V2 MAX: 186 CM/SEC
BH CV ECHO MEAS - AO V2 VTI: 30.2 CM
BH CV ECHO MEAS - AVA(I,D): 1.67 CM2
BH CV ECHO MEAS - EDV(CUBED): 91.1 ML
BH CV ECHO MEAS - EDV(MOD-SP2): 93 ML
BH CV ECHO MEAS - EDV(MOD-SP4): 94 ML
BH CV ECHO MEAS - EF(MOD-BP): 64.8 %
BH CV ECHO MEAS - EF(MOD-SP2): 61.3 %
BH CV ECHO MEAS - EF(MOD-SP4): 67 %
BH CV ECHO MEAS - ESV(CUBED): 16.7 ML
BH CV ECHO MEAS - ESV(MOD-SP2): 36 ML
BH CV ECHO MEAS - ESV(MOD-SP4): 31 ML
BH CV ECHO MEAS - FS: 43.2 %
BH CV ECHO MEAS - IVS/LVPW: 1 CM
BH CV ECHO MEAS - IVSD: 0.9 CM
BH CV ECHO MEAS - LAT PEAK E' VEL: 10.4 CM/SEC
BH CV ECHO MEAS - LV DIASTOLIC VOL/BSA (35-75): 58.6 CM2
BH CV ECHO MEAS - LV MASS(C)D: 132.8 GRAMS
BH CV ECHO MEAS - LV MAX PG: 4.3 MMHG
BH CV ECHO MEAS - LV MEAN PG: 3 MMHG
BH CV ECHO MEAS - LV SYSTOLIC VOL/BSA (12-30): 19.3 CM2
BH CV ECHO MEAS - LV V1 MAX: 104 CM/SEC
BH CV ECHO MEAS - LV V1 VTI: 18.3 CM
BH CV ECHO MEAS - LVIDD: 4.5 CM
BH CV ECHO MEAS - LVIDS: 2.6 CM
BH CV ECHO MEAS - LVOT AREA: 2.8 CM2
BH CV ECHO MEAS - LVOT DIAM: 1.88 CM
BH CV ECHO MEAS - LVPWD: 0.9 CM
BH CV ECHO MEAS - MED PEAK E' VEL: 8.3 CM/SEC
BH CV ECHO MEAS - MR MAX PG: 79.8 MMHG
BH CV ECHO MEAS - MR MAX VEL: 446.6 CM/SEC
BH CV ECHO MEAS - MV A DUR: 0.1 SEC
BH CV ECHO MEAS - MV A MAX VEL: 76.7 CM/SEC
BH CV ECHO MEAS - MV DEC SLOPE: 800.9 CM/SEC2
BH CV ECHO MEAS - MV DEC TIME: 0.15 SEC
BH CV ECHO MEAS - MV E MAX VEL: 79.3 CM/SEC
BH CV ECHO MEAS - MV E/A: 1.03
BH CV ECHO MEAS - MV MAX PG: 4.1 MMHG
BH CV ECHO MEAS - MV MEAN PG: 2.09 MMHG
BH CV ECHO MEAS - MV P1/2T: 35.7 MSEC
BH CV ECHO MEAS - MV V2 VTI: 18.9 CM
BH CV ECHO MEAS - MVA(P1/2T): 6.2 CM2
BH CV ECHO MEAS - MVA(VTI): 2.7 CM2
BH CV ECHO MEAS - PA ACC TIME: 0.11 SEC
BH CV ECHO MEAS - PA V2 MAX: 120.8 CM/SEC
BH CV ECHO MEAS - PULM A REVS DUR: 0.09 SEC
BH CV ECHO MEAS - PULM A REVS VEL: 30.4 CM/SEC
BH CV ECHO MEAS - PULM DIAS VEL: 43.3 CM/SEC
BH CV ECHO MEAS - PULM S/D: 0.88
BH CV ECHO MEAS - PULM SYS VEL: 38.1 CM/SEC
BH CV ECHO MEAS - QP/QS: 0.71
BH CV ECHO MEAS - RAP SYSTOLE: 3 MMHG
BH CV ECHO MEAS - RV MAX PG: 1.2 MMHG
BH CV ECHO MEAS - RV V1 MAX: 54.8 CM/SEC
BH CV ECHO MEAS - RV V1 VTI: 10.5 CM
BH CV ECHO MEAS - RVOT DIAM: 2.09 CM
BH CV ECHO MEAS - RVSP: 23.1 MMHG
BH CV ECHO MEAS - SI(MOD-SP2): 35.5 ML/M2
BH CV ECHO MEAS - SI(MOD-SP4): 39.3 ML/M2
BH CV ECHO MEAS - SUP REN AO DIAM: 2.2 CM
BH CV ECHO MEAS - SV(LVOT): 50.5 ML
BH CV ECHO MEAS - SV(MOD-SP2): 57 ML
BH CV ECHO MEAS - SV(MOD-SP4): 63 ML
BH CV ECHO MEAS - SV(RVOT): 35.9 ML
BH CV ECHO MEAS - TAPSE (>1.6): 2.7 CM
BH CV ECHO MEAS - TR MAX PG: 20.1 MMHG
BH CV ECHO MEAS - TR MAX VEL: 224 CM/SEC
BH CV ECHO MEASUREMENTS AVERAGE E/E' RATIO: 8.48
BH CV XLRA - RV BASE: 2.6 CM
BH CV XLRA - RV LENGTH: 7.5 CM
BH CV XLRA - RV MID: 2.6 CM
BH CV XLRA - TDI S': 19.1 CM/SEC
LEFT ATRIUM VOLUME INDEX: 23.9 ML/M2
SINUS: 2.33 CM
STJ: 2.17 CM

## 2023-10-24 PROCEDURE — 93306 TTE W/DOPPLER COMPLETE: CPT

## 2023-10-24 PROCEDURE — 93306 TTE W/DOPPLER COMPLETE: CPT | Performed by: INTERNAL MEDICINE

## 2023-10-26 ENCOUNTER — ROUTINE PRENATAL (OUTPATIENT)
Dept: OBSTETRICS AND GYNECOLOGY | Facility: CLINIC | Age: 28
End: 2023-10-26
Payer: COMMERCIAL

## 2023-10-26 VITALS — DIASTOLIC BLOOD PRESSURE: 72 MMHG | SYSTOLIC BLOOD PRESSURE: 106 MMHG | BODY MASS INDEX: 30.47 KG/M2 | WEIGHT: 156 LBS

## 2023-10-26 DIAGNOSIS — Z36.89 ENCOUNTER FOR ULTRASOUND TO ASSESS FETAL GROWTH: ICD-10-CM

## 2023-10-26 DIAGNOSIS — B37.2 YEAST INFECTION OF THE SKIN: ICD-10-CM

## 2023-10-26 DIAGNOSIS — O99.019 MATERNAL ANEMIA IN PREGNANCY, ANTEPARTUM: ICD-10-CM

## 2023-10-26 DIAGNOSIS — R00.2 HEART PALPITATIONS: ICD-10-CM

## 2023-10-26 DIAGNOSIS — Z34.90 FUNDAL HEIGHT HIGH FOR DATES: ICD-10-CM

## 2023-10-26 DIAGNOSIS — N89.8 VAGINAL DISCHARGE DURING PREGNANCY IN THIRD TRIMESTER: ICD-10-CM

## 2023-10-26 DIAGNOSIS — Z34.93 PRENATAL CARE, THIRD TRIMESTER: ICD-10-CM

## 2023-10-26 DIAGNOSIS — O26.893 VAGINAL DISCHARGE DURING PREGNANCY IN THIRD TRIMESTER: ICD-10-CM

## 2023-10-26 DIAGNOSIS — Z3A.34 34 WEEKS GESTATION OF PREGNANCY: Primary | ICD-10-CM

## 2023-10-26 DIAGNOSIS — O36.8130 DECREASED FETAL MOVEMENTS IN THIRD TRIMESTER, SINGLE OR UNSPECIFIED FETUS: ICD-10-CM

## 2023-10-26 RX ORDER — NYSTATIN AND TRIAMCINOLONE ACETONIDE 100000; 1 [USP'U]/G; MG/G
1 OINTMENT TOPICAL 2 TIMES DAILY
Qty: 14 G | Refills: 0 | Status: SHIPPED | OUTPATIENT
Start: 2023-10-26 | End: 2023-11-02

## 2023-10-26 NOTE — PROGRESS NOTES
Chief Complaint   Patient presents with    Routine Prenatal Visit      Ian Perez is a 28 y.o.  at 34w3d who presents for routine prenatal visit. She reports doing okay but has been feeling decreased fetal movement for several days now.  She also complains of itching vaginal discharge for the last several days. Denies vaginal bleeding, cramping, contractions, LOF.    /72   Wt 70.8 kg (156 lb)   LMP 2023   BMI 30.47 kg/m²    Gen: well appearing, NAD   Abd: gravid, nontender  Pelvis: normal external female genitalia, normal vulva with erythematous rash noted in the inguinal folds. Speculum exam noted thick white clumpy vaginal discharge consistent with yeast infection.   See OB Flowsheet    ASSESSMENT:   IUP at 34w3d   Prenatal care in third trimester   Maternal anemia affecting pregnancy s/p 2 IV iron infusions   Heart palpations - normal echo this pregnancy  Fundal height high for dates- LGA fetus   Decreased fetal movement  Breech presentation at 34 weeks   Vaginal discharge -yeast infection     PLAN:  Problem list reviewed and updated.   Reviewed expectations of this stage of pregnancy.   Third trimester precautions reviewed including labor signs, monitoring fetal movements.   Ultrasound performed today for decreased fetal movement and fundal height high for dates that demonstrated a LGA fetus measuring 3583 g (7 lb 14 oz) (EFW 97%ile, AC >99%ile, HC >99%ile, BPD >99%ile), upper limit of normal JEAN 24.8 cm, breech presentation, posterior placenta, and BPP 8/8. Patient reassured by  testing and advised of fetal kick counts.   Will plan to repeat growth in 4 weeks given LGA fetus and recommend 39 week induction of labor since this fetus is measuring so large. May have to consider a primary  section given LGA fetus. Will reevaluate in 4 weeks though.  Prescribed  Miconazole 2% vaginal cream to be inserted nightly for 7 days and Mycolog cream to be applied to the inguinal  folds twice a day for 7 days for treatment of yeast infection.   Return in about 2 weeks (around 11/9/2023) for Prenatal visit.    Patient Active Problem List    Diagnosis Date Noted    Palpitations 10/10/2023    Acute abdominal pain 06/29/2023    Abdominal pain in pregnancy, second trimester 06/29/2023    Constipation in pregnancy in second trimester 06/29/2023    Anemia in pregnancy, second trimester 06/29/2023    Retained products of conception after delivery without hemorrhage 01/12/2022     Note Last Updated: 1/12/2022     Added automatically from request for surgery [id number]      Abnormal uterine bleeding, postpartum 01/12/2022     Note Last Updated: 1/12/2022     Added automatically from request for surgery [id number]      Maternal anemia in pregnancy, antepartum 09/15/2021    UTI (urinary tract infection) during pregnancy 03/08/2021    Nausea and vomiting of pregnancy, antepartum 03/08/2021       Orders Placed This Encounter   Procedures    US fetal biophysical profile wo non stress testing     Order Specific Question:   Reason for Exam:     Answer:   decreased fetal movement     Order Specific Question:   Release to patient     Answer:   Routine Release [[id number]]    US Ob Follow Up Transabdominal Approach     Order Specific Question:   Reason for Exam:     Answer:   fetal growth assessment, maternal anemia     Order Specific Question:   Release to patient     Answer:   Routine Release [[id number]]     Malorie Tucker MD

## 2023-10-27 ENCOUNTER — HOSPITAL ENCOUNTER (OUTPATIENT)
Dept: INFUSION THERAPY | Facility: HOSPITAL | Age: 28
Discharge: HOME | End: 2023-10-27
Payer: COMMERCIAL

## 2023-10-27 VITALS
DIASTOLIC BLOOD PRESSURE: 78 MMHG | HEART RATE: 95 BPM | RESPIRATION RATE: 18 BRPM | OXYGEN SATURATION: 97 % | SYSTOLIC BLOOD PRESSURE: 91 MMHG | TEMPERATURE: 96.9 F

## 2023-10-27 DIAGNOSIS — O99.019 MATERNAL ANEMIA IN PREGNANCY, ANTEPARTUM: Primary | ICD-10-CM

## 2023-10-27 PROCEDURE — 25810000003 SODIUM CHLORIDE 0.9 % SOLUTION 250 ML FLEX CONT: Performed by: STUDENT IN AN ORGANIZED HEALTH CARE EDUCATION/TRAINING PROGRAM

## 2023-10-27 PROCEDURE — 96365 THER/PROPH/DIAG IV INF INIT: CPT

## 2023-10-27 PROCEDURE — 63710000001 DIPHENHYDRAMINE PER 50 MG: Performed by: STUDENT IN AN ORGANIZED HEALTH CARE EDUCATION/TRAINING PROGRAM

## 2023-10-27 PROCEDURE — 25010000002 IRON SUCROSE PER 1 MG: Performed by: STUDENT IN AN ORGANIZED HEALTH CARE EDUCATION/TRAINING PROGRAM

## 2023-10-27 PROCEDURE — 96366 THER/PROPH/DIAG IV INF ADDON: CPT

## 2023-10-27 RX ORDER — FAMOTIDINE 10 MG/ML
20 INJECTION, SOLUTION INTRAVENOUS ONCE
Status: DISCONTINUED | OUTPATIENT
Start: 2023-10-27 | End: 2023-10-29 | Stop reason: HOSPADM

## 2023-10-27 RX ORDER — FAMOTIDINE 20 MG/1
20 TABLET, FILM COATED ORAL DAILY
COMMUNITY
Start: 2023-05-21

## 2023-10-27 RX ORDER — DIPHENHYDRAMINE HYDROCHLORIDE 50 MG/ML
25 INJECTION INTRAMUSCULAR; INTRAVENOUS ONCE
OUTPATIENT
Start: 2023-11-03

## 2023-10-27 RX ORDER — DIPHENHYDRAMINE HCL 25 MG
25 CAPSULE ORAL ONCE
Status: COMPLETED | OUTPATIENT
Start: 2023-10-27 | End: 2023-10-27

## 2023-10-27 RX ORDER — ACETAMINOPHEN 325 MG/1
650 TABLET ORAL ONCE
Status: COMPLETED | OUTPATIENT
Start: 2023-10-27 | End: 2023-10-27

## 2023-10-27 RX ORDER — FAMOTIDINE 10 MG/ML
20 INJECTION, SOLUTION INTRAVENOUS ONCE
OUTPATIENT
Start: 2023-11-03

## 2023-10-27 RX ORDER — FAMOTIDINE 10 MG/ML
20 INJECTION, SOLUTION INTRAVENOUS AS NEEDED
Status: DISCONTINUED | OUTPATIENT
Start: 2023-10-27 | End: 2023-10-29 | Stop reason: HOSPADM

## 2023-10-27 RX ORDER — ACETAMINOPHEN 500 MG
500 TABLET ORAL
COMMUNITY
Start: 2023-05-21

## 2023-10-27 RX ORDER — DIPHENHYDRAMINE HYDROCHLORIDE 50 MG/ML
25 INJECTION INTRAMUSCULAR; INTRAVENOUS ONCE
Status: DISCONTINUED | OUTPATIENT
Start: 2023-10-27 | End: 2023-10-29 | Stop reason: HOSPADM

## 2023-10-27 RX ORDER — ACETAMINOPHEN 325 MG/1
650 TABLET ORAL ONCE
OUTPATIENT
Start: 2023-11-03

## 2023-10-27 RX ORDER — FAMOTIDINE 20 MG/1
20 TABLET, FILM COATED ORAL ONCE
Status: COMPLETED | OUTPATIENT
Start: 2023-10-27 | End: 2023-10-27

## 2023-10-27 RX ORDER — SODIUM CHLORIDE 9 MG/ML
250 INJECTION, SOLUTION INTRAVENOUS ONCE
OUTPATIENT
Start: 2023-11-03

## 2023-10-27 RX ORDER — DIPHENHYDRAMINE HYDROCHLORIDE 50 MG/ML
50 INJECTION INTRAMUSCULAR; INTRAVENOUS AS NEEDED
Status: DISCONTINUED | OUTPATIENT
Start: 2023-10-27 | End: 2023-10-29 | Stop reason: HOSPADM

## 2023-10-27 RX ADMIN — FAMOTIDINE 20 MG: 20 TABLET, FILM COATED ORAL at 13:57

## 2023-10-27 RX ADMIN — ACETAMINOPHEN 650 MG: 325 TABLET ORAL at 13:56

## 2023-10-27 RX ADMIN — DIPHENHYDRAMINE HYDROCHLORIDE 25 MG: 25 CAPSULE ORAL at 13:56

## 2023-10-27 RX ADMIN — IRON SUCROSE 300 MG: 20 INJECTION, SOLUTION INTRAVENOUS at 14:14

## 2023-10-27 NOTE — PROGRESS NOTES
Patient stated she had an area on her left forearm that she stated she had burned.  Clear but has some swelling .Petroleum gauze applied and lightly wrapped with a kerlix.

## 2023-11-06 ENCOUNTER — PATIENT OUTREACH (OUTPATIENT)
Dept: LABOR AND DELIVERY | Facility: HOSPITAL | Age: 28
End: 2023-11-06
Payer: COMMERCIAL

## 2023-11-06 NOTE — OUTREACH NOTE
[facility]  Check-In    Current Estimated Gestational Age: 36w0d      Questions/Answers      Flowsheet Row Responses   Best Method for Contacting Cell   Demographics Reviewed Yes   Currently Employed No   Able to keep appointments as scheduled Yes   Gender(s) and Name(s) girl   Baby Active/Feeling Fetal Movemen Yes   How are you presently feeling? been sick the last few days, better now   Supplies ready for baby Car Seat, Clothing, Crib   Resource/Environmental Concerns Financial   Do you have any questions related to your care experience, your pregnancy, plans for delivery, any concerns, etc? No   Other Education SNAP Benefits, WIC Benefits, Insurance benefits/Incentives          Pt states she has been sick for the last few days but feeling better now. She has all necessary infant supplies except diapers, will try to get ride to maternity pantry this week. She is now receiving SNAP since she is off work. Reviewed labor precautions and she reports active fetal movement. F/u inpatient after delivery.     Karena Galvin RN  Maternity Nurse Navigator    11/6/2023, 13:35 EST

## 2023-11-09 ENCOUNTER — ROUTINE PRENATAL (OUTPATIENT)
Dept: OBSTETRICS AND GYNECOLOGY | Facility: CLINIC | Age: 28
End: 2023-11-09
Payer: COMMERCIAL

## 2023-11-09 VITALS — SYSTOLIC BLOOD PRESSURE: 93 MMHG | WEIGHT: 160 LBS | DIASTOLIC BLOOD PRESSURE: 63 MMHG | BODY MASS INDEX: 31.25 KG/M2

## 2023-11-09 DIAGNOSIS — R00.2 HEART PALPITATIONS: ICD-10-CM

## 2023-11-09 DIAGNOSIS — O26.849 UTERINE SIZE DATE DISCREPANCY PREGNANCY: ICD-10-CM

## 2023-11-09 DIAGNOSIS — Z3A.36 36 WEEKS GESTATION OF PREGNANCY: Primary | ICD-10-CM

## 2023-11-09 DIAGNOSIS — O40.3XX0 POLYHYDRAMNIOS AFFECTING PREGNANCY IN THIRD TRIMESTER: ICD-10-CM

## 2023-11-09 DIAGNOSIS — Z34.83 PRENATAL CARE, SUBSEQUENT PREGNANCY, THIRD TRIMESTER: ICD-10-CM

## 2023-11-09 DIAGNOSIS — O99.019 MATERNAL ANEMIA IN PREGNANCY, ANTEPARTUM: ICD-10-CM

## 2023-11-09 DIAGNOSIS — O36.8130 DECREASED FETAL MOVEMENTS IN THIRD TRIMESTER, SINGLE OR UNSPECIFIED FETUS: ICD-10-CM

## 2023-11-09 NOTE — PROGRESS NOTES
Chief Complaint   Patient presents with    Routine Prenatal Visit      Ian Perez is a 28 y.o.  at 36w3d who presents for routine prenatal visit. She reports doing okay but has noticed less movement of her baby. It has been significantly decreased over the last couple of days.  Denies vaginal bleeding, cramping, contractions, LOF.    BP 93/63   Wt 72.6 kg (160 lb)   LMP 2023   BMI 31.25 kg/m²    Gen: well appearing, NAD   Abd: gravid, nontender  SVE: 0/20/-3   See OB Flowsheet    ASSESSMENT:   IUP at 36w3d   Prenatal care in third trimester   Maternal anemia affecting pregnancy s/p IV iron infusions  Heart palpitations- normal echo this pregnancy  Decreased fetal movement in third trimester  Breech presentation at 34 weeks- resolved on ultrasound today  Mild polyhydramnios - JEAN 25.3 cm   Uterine size discrepancy - size > dates    PLAN:  Problem list reviewed and updated.   Reviewed expectations of this stage of pregnancy.   Third trimester precautions reviewed including labor signs, monitoring fetal movements. Reviewed fetal kick counts.   Ultrasound performed today for decreased fetal movement in  the third trimester which returned reassuring with BPP 8/8, mild polyhydramnios JEAN 25.3 cm, posterior placenta, and posterior placenta. Will obtain growth ultrasound at next visit given uterine size larger than dates.   Collected GBS swab today.   Return in about 1 week (around 2023) for Prenatal visit, growth ultrasound.    Patient Active Problem List    Diagnosis Date Noted    Palpitations 10/10/2023    Acute abdominal pain 2023    Abdominal pain in pregnancy, second trimester 2023    Constipation in pregnancy in second trimester 2023    Anemia in pregnancy, second trimester 2023    Retained products of conception after delivery without hemorrhage 2022     Note Last Updated: 2022     Added automatically from request for surgery [id number]      Abnormal  uterine bleeding, postpartum 01/12/2022     Note Last Updated: 1/12/2022     Added automatically from request for surgery [id number]      Maternal anemia in pregnancy, antepartum 09/15/2021    UTI (urinary tract infection) during pregnancy 03/08/2021    Nausea and vomiting of pregnancy, antepartum 03/08/2021       Orders Placed This Encounter   Procedures    Group B Streptococcus Culture - Swab, Vaginal/Rectum     Order Specific Question:   Release to patient     Answer:   Routine Release [[id number]]    POC Urinalysis Dipstick     Order Specific Question:   Release to patient     Answer:   Routine Release [[id number]]     Malorie Tucker MD

## 2023-11-13 LAB — B-HEM STREP SPEC QL CULT: POSITIVE

## 2023-11-16 ENCOUNTER — ROUTINE PRENATAL (OUTPATIENT)
Dept: OBSTETRICS AND GYNECOLOGY | Facility: CLINIC | Age: 28
End: 2023-11-16
Payer: COMMERCIAL

## 2023-11-16 VITALS — SYSTOLIC BLOOD PRESSURE: 98 MMHG | WEIGHT: 158 LBS | BODY MASS INDEX: 30.86 KG/M2 | DIASTOLIC BLOOD PRESSURE: 65 MMHG

## 2023-11-16 DIAGNOSIS — Z3A.37 37 WEEKS GESTATION OF PREGNANCY: Primary | ICD-10-CM

## 2023-11-16 DIAGNOSIS — N89.8 VAGINAL ITCHING: ICD-10-CM

## 2023-11-16 DIAGNOSIS — O36.63X0 EXCESSIVE FETAL GROWTH AFFECTING MANAGEMENT OF PREGNANCY IN THIRD TRIMESTER, SINGLE OR UNSPECIFIED FETUS: ICD-10-CM

## 2023-11-16 DIAGNOSIS — O40.3XX0 POLYHYDRAMNIOS AFFECTING PREGNANCY IN THIRD TRIMESTER: ICD-10-CM

## 2023-11-16 DIAGNOSIS — B96.89 BV (BACTERIAL VAGINOSIS): ICD-10-CM

## 2023-11-16 DIAGNOSIS — O99.019 MATERNAL ANEMIA IN PREGNANCY, ANTEPARTUM: ICD-10-CM

## 2023-11-16 DIAGNOSIS — N76.0 BV (BACTERIAL VAGINOSIS): ICD-10-CM

## 2023-11-16 DIAGNOSIS — Z34.93 PRENATAL CARE, THIRD TRIMESTER: ICD-10-CM

## 2023-11-16 RX ORDER — METRONIDAZOLE 500 MG/1
500 TABLET ORAL 2 TIMES DAILY
Qty: 14 TABLET | Refills: 0 | Status: SHIPPED | OUTPATIENT
Start: 2023-11-16 | End: 2023-11-23

## 2023-11-16 NOTE — PATIENT INSTRUCTIONS
Baby is measuring 4256 g (9 lb 6 oz) which is 99%ile overall with a head and abdomen measuring over 99%ile for this gestational age. I recommend considering a  section since we have a 1.5 week to go.

## 2023-11-16 NOTE — PROGRESS NOTES
Chief Complaint   Patient presents with    Routine Prenatal Visit      Ian Perez is a 28 y.o.  at 37w3d who presents for routine prenatal visit. She reports having increased vaginal itching and pelvic discomfort. She denies abnormal vaginal discharge. Denies contractions, vaginal bleeding or leakage of fluid. She reports active fetal movement.     BP 98/65   Wt 71.7 kg (158 lb)   LMP 2023   BMI 30.86 kg/m²    Gen: well appearing, NAD   Abd: gravid, nontender  SVE: 0/50/-3, foul smelling thin white vaginal discharge in vaginal vault   See OB Flowsheet    ASSESSMENT:   IUP at 37w3d   Prenatal care in third trimester   Maternal anemia affecting pregnancy- s/p IV iron infusions  Heart palpitations- normal echo this pregnancy  Suspected macrosomia- EFW 4256 g (9 lb 6 oz), 98.7%ile, AC >99%ile   Moderate polyhydramnios - JEAN 33 cm   Vaginal itching    PLAN:  Problem list reviewed and updated.   Reviewed expectations of this stage of pregnancy.   Third trimester precautions reviewed including labor signs, monitoring fetal movements.  Collected NuSwab BV/Candida to evaluate vaginal itching and prescribed Flagyl 500 mg BID PO x 7 days for treatment of suspected BV.   Discussed ultrasound results with the patient that demonstrated BPP 8/8, cephalic presentation, posterior placenta, LGA fetus with EFW 98.7%ile, AC >99%ile, BPD >99%ile, HC 99%ile with EFW 4256 g (9 lb 6 oz), moderate polyhydramnios JEAN 33 cm.   I discussed with the patient that I will speak to [facility] and see recommendations regarding delivery timing and contact the patient as soon as I speak with Dr. Klein. I counseled the patient that her baby has suspected macrosomia given EFW and will only increase in size from this time. I would like her to consider a  section if she goes to 39 weeks versus possible induction of labor. There are risks of proceeding with an induction of labor with this size of baby if she proceeds to 39 weeks  including but not limited to arrest of labor requiring  section, possible shoulder dystocia that could lead to brachial plexus injury, anoxic brain injury, and even death to her fetus during delivery. Now, I cannot predict any outcome and she has previously had a 8 lb 12 oz infant in her last pregnancy. She would like to discuss above information at home with her  and will consider a  section for suspected macrosomia. She is to follow up in 1 week for prenatal visit.         Patient Active Problem List    Diagnosis Date Noted    Palpitations 10/10/2023    Acute abdominal pain 2023    Abdominal pain in pregnancy, second trimester 2023    Constipation in pregnancy in second trimester 2023    Anemia in pregnancy, second trimester 2023    Retained products of conception after delivery without hemorrhage 2022     Note Last Updated: 2022     Added automatically from request for surgery [id number]      Abnormal uterine bleeding, postpartum 2022     Note Last Updated: 2022     Added automatically from request for surgery [id number]      Maternal anemia in pregnancy, antepartum 09/15/2021    UTI (urinary tract infection) during pregnancy 2021    Nausea and vomiting of pregnancy, antepartum 2021       Orders Placed This Encounter   Procedures    POC Urinalysis Dipstick     Order Specific Question:   Release to patient     Answer:   Routine Release [[id number]]     Malorie Tucker MD

## 2023-11-17 ENCOUNTER — TELEPHONE (OUTPATIENT)
Dept: OBSTETRICS AND GYNECOLOGY | Facility: CLINIC | Age: 28
End: 2023-11-17
Payer: COMMERCIAL

## 2023-11-17 ENCOUNTER — TELEPHONE (OUTPATIENT)
Dept: LABOR AND DELIVERY | Facility: HOSPITAL | Age: 28
End: 2023-11-17
Payer: COMMERCIAL

## 2023-11-17 NOTE — TELEPHONE ENCOUNTER
Pt called again for information about maternity pantry, she is planning to go today to get clothing and diapers. Address and hours given. She will also see if they have a car seat.

## 2023-11-17 NOTE — TELEPHONE ENCOUNTER
Attempted to contact the patient x 2 but phone is not accepting calls at this time.     I was attempting to schedule the patient for IOL at 38 weeks for moderate polyhydramnios on Monday 11/20/23.     Malorie Tucker MD

## 2023-11-19 ENCOUNTER — TELEPHONE (OUTPATIENT)
Dept: OBSTETRICS AND GYNECOLOGY | Facility: CLINIC | Age: 28
End: 2023-11-19
Payer: COMMERCIAL

## 2023-11-19 NOTE — TELEPHONE ENCOUNTER
Attempted call the patient but no answer. Called her brother and asked him to tell her to call the office.     Malorie Tucker MD

## 2023-11-20 LAB
A VAGINAE DNA VAG QL NAA+PROBE: NORMAL SCORE
BVAB2 DNA VAG QL NAA+PROBE: NORMAL SCORE
C ALBICANS DNA VAG QL NAA+PROBE: NEGATIVE
C GLABRATA DNA VAG QL NAA+PROBE: NEGATIVE
MEGA1 DNA VAG QL NAA+PROBE: NORMAL SCORE

## 2023-11-30 ENCOUNTER — ROUTINE PRENATAL (OUTPATIENT)
Dept: OBSTETRICS AND GYNECOLOGY | Facility: CLINIC | Age: 28
End: 2023-11-30
Payer: COMMERCIAL

## 2023-11-30 VITALS — WEIGHT: 161 LBS | DIASTOLIC BLOOD PRESSURE: 71 MMHG | SYSTOLIC BLOOD PRESSURE: 105 MMHG | BODY MASS INDEX: 31.44 KG/M2

## 2023-11-30 DIAGNOSIS — O36.63X0 EXCESSIVE FETAL GROWTH AFFECTING MANAGEMENT OF PREGNANCY IN THIRD TRIMESTER, SINGLE OR UNSPECIFIED FETUS: ICD-10-CM

## 2023-11-30 DIAGNOSIS — Z3A.39 39 WEEKS GESTATION OF PREGNANCY: Primary | ICD-10-CM

## 2023-11-30 DIAGNOSIS — O40.3XX0 POLYHYDRAMNIOS IN THIRD TRIMESTER COMPLICATION, SINGLE OR UNSPECIFIED FETUS: ICD-10-CM

## 2023-11-30 DIAGNOSIS — O99.019 MATERNAL ANEMIA IN PREGNANCY, ANTEPARTUM: ICD-10-CM

## 2023-11-30 DIAGNOSIS — Z34.93 PRENATAL CARE IN THIRD TRIMESTER: ICD-10-CM

## 2023-12-01 ENCOUNTER — TELEPHONE (OUTPATIENT)
Dept: LABOR AND DELIVERY | Facility: HOSPITAL | Age: 28
End: 2023-12-01
Payer: COMMERCIAL

## 2023-12-01 ENCOUNTER — TELEPHONE (OUTPATIENT)
Dept: OBSTETRICS AND GYNECOLOGY | Facility: CLINIC | Age: 28
End: 2023-12-01
Payer: COMMERCIAL

## 2023-12-01 NOTE — TELEPHONE ENCOUNTER
Attempted to contact the patient but no answer. Left voicemail for the contact the office ASAP.     Malorie Tucker MD

## 2023-12-01 NOTE — TELEPHONE ENCOUNTER
Again attempted to contact the patient regarding decision how she would like to proceed with delivery but no answer. Left voicemail to contact the office.    Malorie Tucker MD

## 2023-12-06 ENCOUNTER — TELEPHONE (OUTPATIENT)
Dept: OBSTETRICS AND GYNECOLOGY | Facility: CLINIC | Age: 28
End: 2023-12-06
Payer: COMMERCIAL

## 2023-12-06 NOTE — TELEPHONE ENCOUNTER
Tried to call patient and got disconnected from [facility]. Tried to call 2xs and left a V/M that Dr Tucker has been trying to reach her.

## 2023-12-07 ENCOUNTER — TELEPHONE (OUTPATIENT)
Dept: OBSTETRICS AND GYNECOLOGY | Facility: CLINIC | Age: 28
End: 2023-12-07
Payer: COMMERCIAL

## 2023-12-07 ENCOUNTER — HOSPITAL ENCOUNTER (EMERGENCY)
Facility: HOSPITAL | Age: 28
Discharge: HOME | End: 2023-12-07
Attending: STUDENT IN AN ORGANIZED HEALTH CARE EDUCATION/TRAINING PROGRAM | Admitting: OBSTETRICS & GYNECOLOGY
Payer: COMMERCIAL

## 2023-12-07 VITALS
HEIGHT: 60 IN | DIASTOLIC BLOOD PRESSURE: 52 MMHG | BODY MASS INDEX: 32.2 KG/M2 | OXYGEN SATURATION: 100 % | RESPIRATION RATE: 16 BRPM | WEIGHT: 164 LBS | SYSTOLIC BLOOD PRESSURE: 107 MMHG | TEMPERATURE: 98.3 F

## 2023-12-07 LAB
BACTERIA UR QL AUTO: ABNORMAL /HPF
BILIRUB UR QL STRIP: NEGATIVE
CLARITY UR: ABNORMAL
COLOR UR: YELLOW
GLUCOSE UR STRIP-MCNC: NEGATIVE MG/DL
HGB UR QL STRIP.AUTO: NEGATIVE
HYALINE CASTS UR QL AUTO: ABNORMAL /LPF
KETONES UR QL STRIP: NEGATIVE
LEUKOCYTE ESTERASE UR QL STRIP.AUTO: ABNORMAL
NITRITE UR QL STRIP: NEGATIVE
PH UR STRIP.AUTO: 7 [PH] (ref 5–8)
PROT UR QL STRIP: ABNORMAL
RBC # UR STRIP: ABNORMAL /HPF
REF LAB TEST METHOD: ABNORMAL
SP GR UR STRIP: 1.01 (ref 1–1.03)
SQUAMOUS #/AREA URNS HPF: ABNORMAL /HPF
UROBILINOGEN UR QL STRIP: ABNORMAL
WBC # UR STRIP: ABNORMAL /HPF

## 2023-12-07 PROCEDURE — 59025 FETAL NON-STRESS TEST: CPT

## 2023-12-07 PROCEDURE — 81001 URINALYSIS AUTO W/SCOPE: CPT | Performed by: OBSTETRICS & GYNECOLOGY

## 2023-12-07 PROCEDURE — 87086 URINE CULTURE/COLONY COUNT: CPT | Performed by: OBSTETRICS & GYNECOLOGY

## 2023-12-07 PROCEDURE — 99284 EMERGENCY DEPT VISIT MOD MDM: CPT | Performed by: OBSTETRICS & GYNECOLOGY

## 2023-12-07 NOTE — TELEPHONE ENCOUNTER
Patient returned phone call. Advised once again to go to [facility]    Inflammation Suggestive Of Cancer Camouflage Histology Text: There was a dense lymphocytic infiltrate which prevented adequate histologic evaluation of adjacent structures.

## 2023-12-07 NOTE — OBED NOTES
[facility] Note OB        Patient Name: Ian Perez  YOB: 1995  MRN: [id number]  Admission Date: 2023  6:15 PM  Date of Service: 2023    Chief Complaint: Contractions (Pt to [facility] with c/o vcontractions off and on since last night around 2200.  Pt denies vaginal bleeding and loss of fluid.)        Subjective     Ian Perez is a 28 y.o. female  at 40w3d with Estimated Date of Delivery: 23 who presents with the chief complaint listed above.  She sees No att. providers found for her prenatal care. Her pregnancy has been complicated by:   suspected fetal macrosomia, polyhydramnios .      She describes fetal movement as normal.  She denies rupture of membranes.  She denies vaginal bleeding. She is feeling contractions.          Objective   Patient Active Problem List    Diagnosis     Palpitations [R00.2]     Acute abdominal pain [R10.9]     Abdominal pain in pregnancy, second trimester [O26.892, R10.9]     Constipation in pregnancy in second trimester [O99.612, K59.00]     Anemia in pregnancy, second trimester [O99.012]     Retained products of conception after delivery without hemorrhage [O73.1]     Abnormal uterine bleeding, postpartum [O72.1]     Maternal anemia in pregnancy, antepartum [O99.019]     UTI (urinary tract infection) during pregnancy [O23.40]     Nausea and vomiting of pregnancy, antepartum [O21.9]         OB History    Para Term  AB Living   2 1 1 0 0 1   SAB IAB Ectopic Molar Multiple Live Births   0 0 0 0 0 1      # Outcome Date GA Lbr Sunny/2nd Weight Sex Delivery Anes PTL Lv   2 Current            1 Term 21 39w6d 14:10 / 00:58 3978 g (8 lb 12.3 oz) F Vag-Spont None N DEEPAK      Birth Comments: scale 4      Name: LAURA GREENFIELD      Apgar1: 6  Apgar5: 9        Past Medical History:   Diagnosis Date    Anemia     during pregnancy       No past surgical history on file.    No current facility-administered medications on file prior to  "encounter.     Current Outpatient Medications on File Prior to Encounter   Medication Sig Dispense Refill    acetaminophen (TYLENOL) 500 MG tablet Take 1 tablet by mouth Every 6 (Six) Hours As Needed for Mild Pain. 50 tablet 0    acetaminophen (TYLENOL) 500 MG tablet Take 1 tablet by mouth.      cephalexin (KEFLEX) 500 MG capsule Take 1 capsule by mouth 2 (Two) Times a Day. 10 capsule 0    famotidine (PEPCID) 20 MG tablet Take 1 tablet by mouth Daily.      lidocaine (LIDODERM) 5 % Place 1 patch on the skin as directed by provider Daily. Remove & Discard patch within 12 hours or as directed by MD 30 each 0       No Known Allergies    No family history on file.    Social History     Socioeconomic History    Marital status:      Spouse name: Tru Rose    Number of children: 1    Highest education level: Some college, no degree   Tobacco Use    Smoking status: Never     Passive exposure: Never    Smokeless tobacco: Never   Vaping Use    Vaping Use: Never used   Substance and Sexual Activity    Alcohol use: Not Currently    Drug use: Never    Sexual activity: Yes     Partners: Male           Review of Systems   HEENT: negative  Respiratory: negative  CV: negative  GI: negative  : contractions  Musculoskeletal: low back pain  Neuro: negative    PHYSICAL EXAM:      VITAL SIGNS:  Vitals:    12/07/23 1856 12/07/23 1945   BP:  107/52   Resp: 16    Temp: 98.3 °F (36.8 °C)    TempSrc: Oral    SpO2:  100%   Weight: 74.4 kg (164 lb)    Height: 152.4 cm (60\")         FHT'S:                   Baseline:  130 BPM  Variability:  Moderate = 6 - 25 BPM  Accelerations:  15 x 15 accelerations present     Decelerations:  absent  Contractions:   present     Interpretation:    Reactive NST, CAT 1 tracing        PHYSICAL EXAM:    General: well developed; well nourished  no acute distress   Heart: Not performed.   Lungs  : breathing is unlabored     Abdomen: soft, non-tender; no masses       Cervix: was checked (by RN): 3 cm / " 50 % / -2  Cervical Dilation (cm): 3  Cervical Effacement: 50%  Fetal Station: -2  Cervical Consistency: soft  Cervical Position: posterior   Contractions: every 8 minutes        Extremities: peripheral pulses normal, no pedal edema, no clubbing or cyanosis      LABS AND TESTING ORDERED:  Uterine and fetal monitoring  Urinalysis  Serial cervical exams    LAB RESULTS:    Recent Results (from the past 24 hour(s))   Urinalysis With Culture If Indicated - Urine, Clean Catch    Collection Time: 23  7:05 PM    Specimen: Urine, Clean Catch   Result Value Ref Range    Color, UA Yellow Yellow, Straw    Appearance, UA Cloudy (A) Clear    pH, UA 7.0 5.0 - 8.0    Specific Gravity, UA 1.015 1.005 - 1.030    Glucose, UA Negative Negative    Ketones, UA Negative Negative    Bilirubin, UA Negative Negative    Blood, UA Negative Negative    Protein, UA 30 mg/dL (1+) (A) Negative    Leuk Esterase, UA Large (3+) (A) Negative    Nitrite, UA Negative Negative    Urobilinogen, UA 1.0 E.U./dL 0.2 - 1.0 E.U./dL   Urinalysis, Microscopic Only - Urine, Clean Catch    Collection Time: 23  7:05 PM    Specimen: Urine, Clean Catch   Result Value Ref Range    RBC, UA 0-2 None Seen, 0-2 /HPF    WBC, UA 21-50 (A) None Seen, 0-2 /HPF    Bacteria, UA 4+ (A) None Seen /HPF    Squamous Epithelial Cells, UA 13-20 (A) None Seen, 0-2 /HPF    Hyaline Casts, UA 7-12 None Seen /LPF    Methodology Automated Microscopy        Lab Results   Component Value Date    ABO A 10/20/2023    RH Positive 10/20/2023       Lab Results   Component Value Date    STREPGPB Positive (A) 2023                 Assessment & Plan     ASSESSMENT/PLAN:  Ian Perez is a 28 y.o. female  at 40w3d who presented with: contractions          Final Impression:  Pregnancy at 40w3d  Reactive NST.  CAT 1 tracing  Contractions  Maternal vital signs were reviewed and were unremarkable              Vitals:    23 1856 23 1945   BP:  107/52   Resp: 16   "  Temp: 98.3 °F (36.8 °C)    TempSrc: Oral    SpO2:  100%   Weight: 74.4 kg (164 lb)    Height: 152.4 cm (60\")        Lab Results   Component Value Date    STREPGPB Positive (A) 2023     Lab Results   Component Value Date    ABO A 10/20/2023    RH Positive 10/20/2023         PLAN:     No cervical change after 1 hour.  Infrequent contractions  Discussed route of delivery in light of suspected fetal macrosomia.  Patient now wants a primary  section.  Physician on call contacted.  Plan is for patient to call the office tomorrow morning to see if her  section can be scheduled.  Patient was instructed not to eat until she is given a date and time for her scheduled  section with Dr. Tucker.    Patient was discharged home with instructions to return to [facility] if she experiences contractions that are more frequent or more painful, leaking fluid, vaginal bleeding or decreased fetal movement.    I have spent 30 minutes including face to face time with the patient, greater than 50% in discussion of the diagnosis (counseling) and/or coordination of care.     Natali López MD  2023  20:15 EST  OB Hospitalist  Phone:  [phone]  "

## 2023-12-07 NOTE — TELEPHONE ENCOUNTER
40w 3d ob pt returned call from yesterday-she apologizes, her phone  and it was after 5:00 when she tried to return the call.     Pt reports she does not feel good.  C/o back pain that comes and goes since last night.  Since Dr. Tucker is out of the office today and tomorrow, I recommended pt go to [facility] L[facility] for evaluation.

## 2023-12-07 NOTE — TELEPHONE ENCOUNTER
Patient was told to go to L&D this morning. I noticed she still has not gone by this afternoon. I left a V/M to encourage her to go.

## 2023-12-08 ENCOUNTER — HOSPITAL ENCOUNTER (INPATIENT)
Facility: HOSPITAL | Age: 28
LOS: 3 days | Discharge: HOME | End: 2023-12-11
Attending: STUDENT IN AN ORGANIZED HEALTH CARE EDUCATION/TRAINING PROGRAM | Admitting: OBSTETRICS & GYNECOLOGY
Payer: COMMERCIAL

## 2023-12-08 ENCOUNTER — ANESTHESIA (OUTPATIENT)
Dept: LABOR AND DELIVERY | Facility: HOSPITAL | Age: 28
End: 2023-12-08
Payer: COMMERCIAL

## 2023-12-08 ENCOUNTER — PATIENT OUTREACH (OUTPATIENT)
Dept: LABOR AND DELIVERY | Facility: HOSPITAL | Age: 28
End: 2023-12-08
Payer: COMMERCIAL

## 2023-12-08 ENCOUNTER — ANESTHESIA EVENT (OUTPATIENT)
Dept: LABOR AND DELIVERY | Facility: HOSPITAL | Age: 28
End: 2023-12-08
Payer: COMMERCIAL

## 2023-12-08 DIAGNOSIS — Z98.891 S/P CESAREAN SECTION: Primary | ICD-10-CM

## 2023-12-08 LAB
ABO GROUP BLD: NORMAL
ALBUMIN SERPL-MCNC: 3.2 G/DL (ref 3.5–5.2)
ALBUMIN/GLOB SERPL: 1.1 G/DL
ALP SERPL-CCNC: 121 U/L (ref 39–117)
ALT SERPL W P-5'-P-CCNC: 7 U/L (ref 1–33)
ANION GAP SERPL CALCULATED.3IONS-SCNC: 11.3 MMOL/L (ref 5–15)
AST SERPL-CCNC: 18 U/L (ref 1–32)
BASOPHILS # BLD AUTO: 0.02 10*3/MM3 (ref 0–0.2)
BASOPHILS NFR BLD AUTO: 0.3 % (ref 0–1.5)
BILIRUB SERPL-MCNC: 0.3 MG/DL (ref 0–1.2)
BLD GP AB SCN SERPL QL: NEGATIVE
BUN SERPL-MCNC: 4 MG/DL (ref 6–20)
BUN/CREAT SERPL: 9.5 (ref 7–25)
CALCIUM SPEC-SCNC: 8.6 MG/DL (ref 8.6–10.5)
CHLORIDE SERPL-SCNC: 105 MMOL/L (ref 98–107)
CO2 SERPL-SCNC: 18.7 MMOL/L (ref 22–29)
CREAT SERPL-MCNC: 0.42 MG/DL (ref 0.57–1)
DEPRECATED RDW RBC AUTO: 60.9 FL (ref 37–54)
EGFRCR SERPLBLD CKD-EPI 2021: 136.8 ML/MIN/1.73
EOSINOPHIL # BLD AUTO: 0.08 10*3/MM3 (ref 0–0.4)
EOSINOPHIL NFR BLD AUTO: 1.3 % (ref 0.3–6.2)
ERYTHROCYTE [DISTWIDTH] IN BLOOD BY AUTOMATED COUNT: 19.7 % (ref 12.3–15.4)
GLOBULIN UR ELPH-MCNC: 3 GM/DL
GLUCOSE SERPL-MCNC: 105 MG/DL (ref 65–99)
HCT VFR BLD AUTO: 35.2 % (ref 34–46.6)
HGB BLD-MCNC: 11.6 G/DL (ref 12–15.9)
IMM GRANULOCYTES # BLD AUTO: 0.04 10*3/MM3 (ref 0–0.05)
IMM GRANULOCYTES NFR BLD AUTO: 0.7 % (ref 0–0.5)
LYMPHOCYTES # BLD AUTO: 2.35 10*3/MM3 (ref 0.7–3.1)
LYMPHOCYTES NFR BLD AUTO: 38.7 % (ref 19.6–45.3)
MCH RBC QN AUTO: 28.3 PG (ref 26.6–33)
MCHC RBC AUTO-ENTMCNC: 33 G/DL (ref 31.5–35.7)
MCV RBC AUTO: 85.9 FL (ref 79–97)
MONOCYTES # BLD AUTO: 0.5 10*3/MM3 (ref 0.1–0.9)
MONOCYTES NFR BLD AUTO: 8.2 % (ref 5–12)
NEUTROPHILS NFR BLD AUTO: 3.08 10*3/MM3 (ref 1.7–7)
NEUTROPHILS NFR BLD AUTO: 50.8 % (ref 42.7–76)
NRBC BLD AUTO-RTO: 0 /100 WBC (ref 0–0.2)
PLATELET # BLD AUTO: 132 10*3/MM3 (ref 140–450)
PMV BLD AUTO: 11.7 FL (ref 6–12)
POTASSIUM SERPL-SCNC: 4.1 MMOL/L (ref 3.5–5.2)
PROT SERPL-MCNC: 6.2 G/DL (ref 6–8.5)
RBC # BLD AUTO: 4.1 10*6/MM3 (ref 3.77–5.28)
RH BLD: POSITIVE
SODIUM SERPL-SCNC: 135 MMOL/L (ref 136–145)
T&S EXPIRATION DATE: NORMAL
WBC NRBC COR # BLD AUTO: 6.07 10*3/MM3 (ref 3.4–10.8)

## 2023-12-08 PROCEDURE — 86850 RBC ANTIBODY SCREEN: CPT | Performed by: OBSTETRICS & GYNECOLOGY

## 2023-12-08 PROCEDURE — 99202 OFFICE O/P NEW SF 15 MIN: CPT | Performed by: OBSTETRICS & GYNECOLOGY

## 2023-12-08 PROCEDURE — 25010000002 ONDANSETRON PER 1 MG: Performed by: ANESTHESIOLOGY

## 2023-12-08 PROCEDURE — 25010000002 CEFAZOLIN IN DEXTROSE 2000 MG/ 100 ML SOLUTION: Performed by: OBSTETRICS & GYNECOLOGY

## 2023-12-08 PROCEDURE — 25810000003 LACTATED RINGERS SOLUTION: Performed by: OBSTETRICS & GYNECOLOGY

## 2023-12-08 PROCEDURE — 86900 BLOOD TYPING SEROLOGIC ABO: CPT | Performed by: OBSTETRICS & GYNECOLOGY

## 2023-12-08 PROCEDURE — 86901 BLOOD TYPING SEROLOGIC RH(D): CPT | Performed by: OBSTETRICS & GYNECOLOGY

## 2023-12-08 PROCEDURE — 85025 COMPLETE CBC W/AUTO DIFF WBC: CPT | Performed by: OBSTETRICS & GYNECOLOGY

## 2023-12-08 PROCEDURE — 25010000002 BUPIVACAINE PF 0.75 % SOLUTION: Performed by: ANESTHESIOLOGY

## 2023-12-08 PROCEDURE — 25010000002 KETOROLAC TROMETHAMINE PER 15 MG: Performed by: NURSE ANESTHETIST, CERTIFIED REGISTERED

## 2023-12-08 PROCEDURE — 25010000002 KETOROLAC TROMETHAMINE PER 15 MG: Performed by: OBSTETRICS & GYNECOLOGY

## 2023-12-08 PROCEDURE — S0260 H&P FOR SURGERY: HCPCS | Performed by: OBSTETRICS & GYNECOLOGY

## 2023-12-08 PROCEDURE — 25010000002 AZITHROMYCIN PER 500 MG: Performed by: OBSTETRICS & GYNECOLOGY

## 2023-12-08 PROCEDURE — 25810000003 LACTATED RINGERS PER 1000 ML: Performed by: NURSE ANESTHETIST, CERTIFIED REGISTERED

## 2023-12-08 PROCEDURE — 59515 CESAREAN DELIVERY: CPT | Performed by: OBSTETRICS & GYNECOLOGY

## 2023-12-08 PROCEDURE — 25010000002 FENTANYL CITRATE (PF) 50 MCG/ML SOLUTION: Performed by: ANESTHESIOLOGY

## 2023-12-08 PROCEDURE — 25010000002 METHYLERGONOVINE MALEATE PER 0.2 MG: Performed by: OBSTETRICS & GYNECOLOGY

## 2023-12-08 PROCEDURE — 25810000003 SODIUM CHLORIDE 0.9 % SOLUTION 250 ML FLEX CONT: Performed by: OBSTETRICS & GYNECOLOGY

## 2023-12-08 PROCEDURE — 25010000002 MORPHINE PER 10 MG: Performed by: ANESTHESIOLOGY

## 2023-12-08 PROCEDURE — 25010000002 CEFAZOLIN IN DEXTROSE 2-4 GM/100ML-% SOLUTION: Performed by: OBSTETRICS & GYNECOLOGY

## 2023-12-08 PROCEDURE — 80053 COMPREHEN METABOLIC PANEL: CPT | Performed by: OBSTETRICS & GYNECOLOGY

## 2023-12-08 DEVICE — DEV CONTRL TISS STRATAFIX SPIRAL MNCRYL PLS PS2 3/0 30CM UD: Type: IMPLANTABLE DEVICE | Site: ABDOMEN | Status: FUNCTIONAL

## 2023-12-08 RX ORDER — DROPERIDOL 2.5 MG/ML
0.62 INJECTION, SOLUTION INTRAMUSCULAR; INTRAVENOUS
Status: DISCONTINUED | OUTPATIENT
Start: 2023-12-08 | End: 2023-12-11 | Stop reason: HOSPADM

## 2023-12-08 RX ORDER — OXYTOCIN/0.9 % SODIUM CHLORIDE 30/500 ML
999 PLASTIC BAG, INJECTION (ML) INTRAVENOUS ONCE
Status: COMPLETED | OUTPATIENT
Start: 2023-12-08 | End: 2023-12-08

## 2023-12-08 RX ORDER — MORPHINE SULFATE 2 MG/ML
2 INJECTION, SOLUTION INTRAMUSCULAR; INTRAVENOUS
Status: ACTIVE | OUTPATIENT
Start: 2023-12-08 | End: 2023-12-08

## 2023-12-08 RX ORDER — SODIUM CHLORIDE 9 MG/ML
40 INJECTION, SOLUTION INTRAVENOUS AS NEEDED
Status: DISCONTINUED | OUTPATIENT
Start: 2023-12-08 | End: 2023-12-08 | Stop reason: HOSPADM

## 2023-12-08 RX ORDER — DIPHENHYDRAMINE HYDROCHLORIDE 50 MG/ML
25 INJECTION INTRAMUSCULAR; INTRAVENOUS EVERY 4 HOURS PRN
Status: DISCONTINUED | OUTPATIENT
Start: 2023-12-08 | End: 2023-12-11 | Stop reason: HOSPADM

## 2023-12-08 RX ORDER — SODIUM CHLORIDE, SODIUM LACTATE, POTASSIUM CHLORIDE, CALCIUM CHLORIDE 600; 310; 30; 20 MG/100ML; MG/100ML; MG/100ML; MG/100ML
INJECTION, SOLUTION INTRAVENOUS CONTINUOUS PRN
Status: DISCONTINUED | OUTPATIENT
Start: 2023-12-08 | End: 2023-12-08 | Stop reason: SURG

## 2023-12-08 RX ORDER — IBUPROFEN 600 MG/1
600 TABLET ORAL EVERY 6 HOURS
Status: DISCONTINUED | OUTPATIENT
Start: 2023-12-09 | End: 2023-12-11 | Stop reason: HOSPADM

## 2023-12-08 RX ORDER — BUPIVACAINE HYDROCHLORIDE 7.5 MG/ML
INJECTION, SOLUTION EPIDURAL; RETROBULBAR
Status: COMPLETED | OUTPATIENT
Start: 2023-12-08 | End: 2023-12-08

## 2023-12-08 RX ORDER — ONDANSETRON 4 MG/1
4 TABLET, FILM COATED ORAL EVERY 6 HOURS PRN
Status: DISCONTINUED | OUTPATIENT
Start: 2023-12-08 | End: 2023-12-11 | Stop reason: HOSPADM

## 2023-12-08 RX ORDER — CARBOPROST TROMETHAMINE 250 UG/ML
250 INJECTION, SOLUTION INTRAMUSCULAR
Status: DISCONTINUED | OUTPATIENT
Start: 2023-12-08 | End: 2023-12-08 | Stop reason: HOSPADM

## 2023-12-08 RX ORDER — DIPHENHYDRAMINE HYDROCHLORIDE 50 MG/ML
12.5 INJECTION INTRAMUSCULAR; INTRAVENOUS EVERY 8 HOURS PRN
Status: DISCONTINUED | OUTPATIENT
Start: 2023-12-08 | End: 2023-12-08 | Stop reason: HOSPADM

## 2023-12-08 RX ORDER — KETOROLAC TROMETHAMINE 30 MG/ML
INJECTION, SOLUTION INTRAMUSCULAR; INTRAVENOUS AS NEEDED
Status: DISCONTINUED | OUTPATIENT
Start: 2023-12-08 | End: 2023-12-08 | Stop reason: SURG

## 2023-12-08 RX ORDER — METHYLERGONOVINE MALEATE 0.2 MG/ML
200 INJECTION INTRAVENOUS ONCE AS NEEDED
Status: DISCONTINUED | OUTPATIENT
Start: 2023-12-08 | End: 2023-12-08 | Stop reason: HOSPADM

## 2023-12-08 RX ORDER — CEFAZOLIN SODIUM 2 G/100ML
2 INJECTION, SOLUTION INTRAVENOUS ONCE
Status: COMPLETED | OUTPATIENT
Start: 2023-12-08 | End: 2023-12-08

## 2023-12-08 RX ORDER — FENTANYL CITRATE 50 UG/ML
INJECTION, SOLUTION INTRAMUSCULAR; INTRAVENOUS
Status: COMPLETED | OUTPATIENT
Start: 2023-12-08 | End: 2023-12-08

## 2023-12-08 RX ORDER — FENTANYL CIT 0.2 MG/100ML-ROPIV 0.2%-NACL 0.9% EPIDURAL INJ 2/0.2 MCG/ML-%
10 SOLUTION INJECTION CONTINUOUS
Status: ACTIVE | OUTPATIENT
Start: 2023-12-08 | End: 2023-12-11

## 2023-12-08 RX ORDER — ACETAMINOPHEN 500 MG
1000 TABLET ORAL ONCE
Status: COMPLETED | OUTPATIENT
Start: 2023-12-08 | End: 2023-12-08

## 2023-12-08 RX ORDER — TRANEXAMIC ACID 10 MG/ML
1000 INJECTION, SOLUTION INTRAVENOUS ONCE AS NEEDED
Status: DISCONTINUED | OUTPATIENT
Start: 2023-12-08 | End: 2023-12-08 | Stop reason: HOSPADM

## 2023-12-08 RX ORDER — EPHEDRINE SULFATE 50 MG/ML
INJECTION, SOLUTION INTRAVENOUS AS NEEDED
Status: DISCONTINUED | OUTPATIENT
Start: 2023-12-08 | End: 2023-12-08 | Stop reason: SURG

## 2023-12-08 RX ORDER — ACETAMINOPHEN 500 MG
1000 TABLET ORAL ONCE
Status: DISCONTINUED | OUTPATIENT
Start: 2023-12-08 | End: 2023-12-08 | Stop reason: HOSPADM

## 2023-12-08 RX ORDER — EPHEDRINE SULFATE 50 MG/ML
5 INJECTION, SOLUTION INTRAVENOUS
Status: DISCONTINUED | OUTPATIENT
Start: 2023-12-08 | End: 2023-12-08 | Stop reason: HOSPADM

## 2023-12-08 RX ORDER — KETOROLAC TROMETHAMINE 30 MG/ML
30 INJECTION, SOLUTION INTRAMUSCULAR; INTRAVENOUS ONCE
Status: DISCONTINUED | OUTPATIENT
Start: 2023-12-08 | End: 2023-12-08 | Stop reason: HOSPADM

## 2023-12-08 RX ORDER — ACETAMINOPHEN 325 MG/1
650 TABLET ORAL EVERY 6 HOURS
Status: DISCONTINUED | OUTPATIENT
Start: 2023-12-09 | End: 2023-12-11 | Stop reason: HOSPADM

## 2023-12-08 RX ORDER — SIMETHICONE 80 MG
80 TABLET,CHEWABLE ORAL 4 TIMES DAILY PRN
Status: DISCONTINUED | OUTPATIENT
Start: 2023-12-08 | End: 2023-12-11 | Stop reason: HOSPADM

## 2023-12-08 RX ORDER — ERYTHROMYCIN 5 MG/G
OINTMENT OPHTHALMIC
Status: ACTIVE
Start: 2023-12-08 | End: 2023-12-08

## 2023-12-08 RX ORDER — ONDANSETRON 2 MG/ML
4 INJECTION INTRAMUSCULAR; INTRAVENOUS ONCE AS NEEDED
Status: COMPLETED | OUTPATIENT
Start: 2023-12-08 | End: 2023-12-08

## 2023-12-08 RX ORDER — FAMOTIDINE 10 MG/ML
20 INJECTION, SOLUTION INTRAVENOUS ONCE AS NEEDED
Status: COMPLETED | OUTPATIENT
Start: 2023-12-08 | End: 2023-12-08

## 2023-12-08 RX ORDER — OXYCODONE HYDROCHLORIDE 5 MG/1
5 TABLET ORAL EVERY 4 HOURS PRN
Status: DISCONTINUED | OUTPATIENT
Start: 2023-12-08 | End: 2023-12-11 | Stop reason: HOSPADM

## 2023-12-08 RX ORDER — METHYLERGONOVINE MALEATE 0.2 MG/ML
200 INJECTION INTRAVENOUS ONCE AS NEEDED
Status: COMPLETED | OUTPATIENT
Start: 2023-12-08 | End: 2023-12-08

## 2023-12-08 RX ORDER — ONDANSETRON 2 MG/ML
4 INJECTION INTRAMUSCULAR; INTRAVENOUS ONCE AS NEEDED
Status: DISCONTINUED | OUTPATIENT
Start: 2023-12-08 | End: 2023-12-08 | Stop reason: HOSPADM

## 2023-12-08 RX ORDER — ONDANSETRON 2 MG/ML
4 INJECTION INTRAMUSCULAR; INTRAVENOUS EVERY 6 HOURS PRN
Status: DISCONTINUED | OUTPATIENT
Start: 2023-12-08 | End: 2023-12-11 | Stop reason: HOSPADM

## 2023-12-08 RX ORDER — LIDOCAINE HYDROCHLORIDE 10 MG/ML
0.5 INJECTION, SOLUTION INFILTRATION; PERINEURAL ONCE AS NEEDED
Status: DISCONTINUED | OUTPATIENT
Start: 2023-12-08 | End: 2023-12-08 | Stop reason: HOSPADM

## 2023-12-08 RX ORDER — SODIUM CHLORIDE, SODIUM LACTATE, POTASSIUM CHLORIDE, CALCIUM CHLORIDE 600; 310; 30; 20 MG/100ML; MG/100ML; MG/100ML; MG/100ML
125 INJECTION, SOLUTION INTRAVENOUS CONTINUOUS
Status: DISCONTINUED | OUTPATIENT
Start: 2023-12-08 | End: 2023-12-11 | Stop reason: HOSPADM

## 2023-12-08 RX ORDER — DOCUSATE SODIUM 100 MG/1
100 CAPSULE, LIQUID FILLED ORAL 2 TIMES DAILY PRN
Status: DISCONTINUED | OUTPATIENT
Start: 2023-12-08 | End: 2023-12-11 | Stop reason: HOSPADM

## 2023-12-08 RX ORDER — OXYTOCIN/0.9 % SODIUM CHLORIDE 30/500 ML
125 PLASTIC BAG, INJECTION (ML) INTRAVENOUS CONTINUOUS PRN
Status: COMPLETED | OUTPATIENT
Start: 2023-12-08 | End: 2023-12-08

## 2023-12-08 RX ORDER — OXYTOCIN/0.9 % SODIUM CHLORIDE 30/500 ML
250 PLASTIC BAG, INJECTION (ML) INTRAVENOUS CONTINUOUS
Status: DISPENSED | OUTPATIENT
Start: 2023-12-08 | End: 2023-12-08

## 2023-12-08 RX ORDER — FAMOTIDINE 10 MG/ML
20 INJECTION, SOLUTION INTRAVENOUS ONCE AS NEEDED
Status: DISCONTINUED | OUTPATIENT
Start: 2023-12-08 | End: 2023-12-08 | Stop reason: HOSPADM

## 2023-12-08 RX ORDER — SODIUM CHLORIDE 0.9 % (FLUSH) 0.9 %
10 SYRINGE (ML) INJECTION EVERY 12 HOURS SCHEDULED
Status: DISCONTINUED | OUTPATIENT
Start: 2023-12-08 | End: 2023-12-08 | Stop reason: HOSPADM

## 2023-12-08 RX ORDER — DIPHENHYDRAMINE HCL 25 MG
25 CAPSULE ORAL EVERY 4 HOURS PRN
Status: DISCONTINUED | OUTPATIENT
Start: 2023-12-08 | End: 2023-12-11 | Stop reason: HOSPADM

## 2023-12-08 RX ORDER — OXYCODONE HYDROCHLORIDE 10 MG/1
10 TABLET ORAL EVERY 4 HOURS PRN
Status: DISCONTINUED | OUTPATIENT
Start: 2023-12-08 | End: 2023-12-11 | Stop reason: HOSPADM

## 2023-12-08 RX ORDER — NALOXONE HCL 0.4 MG/ML
0.2 VIAL (ML) INJECTION
Status: CANCELLED | OUTPATIENT
Start: 2023-12-08

## 2023-12-08 RX ORDER — MISOPROSTOL 200 UG/1
800 TABLET ORAL ONCE
Status: DISCONTINUED | OUTPATIENT
Start: 2023-12-08 | End: 2023-12-11 | Stop reason: HOSPADM

## 2023-12-08 RX ORDER — ONDANSETRON 2 MG/ML
4 INJECTION INTRAMUSCULAR; INTRAVENOUS ONCE AS NEEDED
Status: DISCONTINUED | OUTPATIENT
Start: 2023-12-08 | End: 2023-12-11 | Stop reason: HOSPADM

## 2023-12-08 RX ORDER — SODIUM CHLORIDE 0.9 % (FLUSH) 0.9 %
10 SYRINGE (ML) INJECTION AS NEEDED
Status: DISCONTINUED | OUTPATIENT
Start: 2023-12-08 | End: 2023-12-08 | Stop reason: HOSPADM

## 2023-12-08 RX ORDER — MISOPROSTOL 200 UG/1
800 TABLET ORAL ONCE AS NEEDED
Status: DISCONTINUED | OUTPATIENT
Start: 2023-12-08 | End: 2023-12-08 | Stop reason: HOSPADM

## 2023-12-08 RX ORDER — MORPHINE SULFATE 4 MG/ML
INJECTION, SOLUTION INTRAMUSCULAR; INTRAVENOUS
Status: COMPLETED | OUTPATIENT
Start: 2023-12-08 | End: 2023-12-08

## 2023-12-08 RX ORDER — PHYTONADIONE 1 MG/.5ML
INJECTION, EMULSION INTRAMUSCULAR; INTRAVENOUS; SUBCUTANEOUS
Status: ACTIVE
Start: 2023-12-08 | End: 2023-12-08

## 2023-12-08 RX ORDER — ACETAMINOPHEN 500 MG
1000 TABLET ORAL EVERY 6 HOURS
Status: DISPENSED | OUTPATIENT
Start: 2023-12-08 | End: 2023-12-09

## 2023-12-08 RX ORDER — PRENATAL VIT/IRON FUM/FOLIC AC 27MG-0.8MG
1 TABLET ORAL DAILY
Status: DISCONTINUED | OUTPATIENT
Start: 2023-12-08 | End: 2023-12-11 | Stop reason: HOSPADM

## 2023-12-08 RX ORDER — HYDROMORPHONE HYDROCHLORIDE 1 MG/ML
0.5 INJECTION, SOLUTION INTRAMUSCULAR; INTRAVENOUS; SUBCUTANEOUS
Status: DISCONTINUED | OUTPATIENT
Start: 2023-12-08 | End: 2023-12-08 | Stop reason: HOSPADM

## 2023-12-08 RX ORDER — KETOROLAC TROMETHAMINE 15 MG/ML
15 INJECTION, SOLUTION INTRAMUSCULAR; INTRAVENOUS EVERY 6 HOURS
Status: COMPLETED | OUTPATIENT
Start: 2023-12-08 | End: 2023-12-09

## 2023-12-08 RX ADMIN — METHYLERGONOVINE MALEATE 200 MCG: 0.2 INJECTION INTRAVENOUS at 12:15

## 2023-12-08 RX ADMIN — CEFAZOLIN SODIUM 2 G: 2 INJECTION, SOLUTION INTRAVENOUS at 05:43

## 2023-12-08 RX ADMIN — ONDANSETRON 4 MG: 2 INJECTION INTRAMUSCULAR; INTRAVENOUS at 05:29

## 2023-12-08 RX ADMIN — SODIUM CHLORIDE, POTASSIUM CHLORIDE, SODIUM LACTATE AND CALCIUM CHLORIDE: 600; 310; 30; 20 INJECTION, SOLUTION INTRAVENOUS at 05:46

## 2023-12-08 RX ADMIN — ACETAMINOPHEN 1000 MG: 500 TABLET ORAL at 12:15

## 2023-12-08 RX ADMIN — BUPIVACAINE HYDROCHLORIDE 1.5 ML: 7.5 INJECTION, SOLUTION EPIDURAL; RETROBULBAR at 05:56

## 2023-12-08 RX ADMIN — EPHEDRINE SULFATE 5 MG: 50 INJECTION INTRAVENOUS at 05:58

## 2023-12-08 RX ADMIN — KETOROLAC TROMETHAMINE 15 MG: 15 INJECTION, SOLUTION INTRAMUSCULAR; INTRAVENOUS at 12:39

## 2023-12-08 RX ADMIN — SODIUM CHLORIDE, POTASSIUM CHLORIDE, SODIUM LACTATE AND CALCIUM CHLORIDE 1000 ML: 600; 310; 30; 20 INJECTION, SOLUTION INTRAVENOUS at 05:16

## 2023-12-08 RX ADMIN — KETOROLAC TROMETHAMINE 30 MG: 30 INJECTION, SOLUTION INTRAMUSCULAR at 06:29

## 2023-12-08 RX ADMIN — ACETAMINOPHEN 1000 MG: 500 TABLET ORAL at 18:22

## 2023-12-08 RX ADMIN — KETOROLAC TROMETHAMINE 15 MG: 15 INJECTION, SOLUTION INTRAMUSCULAR; INTRAVENOUS at 18:22

## 2023-12-08 RX ADMIN — MORPHINE SULFATE 100 MCG: 4 INJECTION, SOLUTION INTRAMUSCULAR; INTRAVENOUS at 05:56

## 2023-12-08 RX ADMIN — FAMOTIDINE 20 MG: 10 INJECTION, SOLUTION INTRAVENOUS at 05:29

## 2023-12-08 RX ADMIN — Medication 999 ML/HR: at 06:13

## 2023-12-08 RX ADMIN — FENTANYL CITRATE 15 MCG: 50 INJECTION, SOLUTION INTRAMUSCULAR; INTRAVENOUS at 05:56

## 2023-12-08 RX ADMIN — ACETAMINOPHEN 1000 MG: 500 TABLET ORAL at 05:27

## 2023-12-08 RX ADMIN — Medication 125 ML/HR: at 07:45

## 2023-12-08 RX ADMIN — AZITHROMYCIN MONOHYDRATE 500 MG: 500 INJECTION, POWDER, LYOPHILIZED, FOR SOLUTION INTRAVENOUS at 05:46

## 2023-12-08 NOTE — ANESTHESIA PROCEDURE NOTES
Spinal Block      Start Time: 12/8/2023 5:50 AM  Stop Time: 12/8/2023 5:56 AM  Indication:at surgeon's request, post-op pain management and procedure for pain  Performed By  Anesthesiologist: Elroy Barba MD  CRNA/CAA: Petros Hauser CRNA  Preanesthetic Checklist  Completed: patient identified, IV checked, site marked, risks and benefits discussed, surgical consent, monitors and equipment checked, pre-op evaluation and timeout performed  Spinal Block Prep:  Patient Position:sitting  Sterile Tech:cap, gloves, gown, mask and sterile barriers  Prep:Chloraprep  Patient Monitoring:blood pressure monitoring, continuous pulse oximetry and EKG    Spinal Block Procedure  Approach:midline  Guidance:landmark technique and palpation technique  Location:L3-L4  Needle Type:Pencan  Needle Gauge:25 G  Placement of Spinal needle event:cerebrospinal fluid aspirated  Paresthesia: no  Fluid Appearance:clear  Medications: fentaNYL citrate (PF) (SUBLIMAZE) injection - Intrathecal   15 mcg - 12/8/2023 5:56:00 AM  Morphine sulfate (PF) injection - Spinal   100 mcg - 12/8/2023 5:56:00 AM  bupivacaine PF (MARCAINE) 0.75 % injection - Spinal   1.5 mL - 12/8/2023 5:56:00 AM   Post Assessment  Patient Tolerance:patient tolerated the procedure well with no apparent complications  Complications no

## 2023-12-08 NOTE — ANESTHESIA PREPROCEDURE EVALUATION
Anesthesia Evaluation     Patient summary reviewed and Nursing notes reviewed   NPO Solid Status: > 8 hours  NPO Liquid Status: > 2 hours           Airway   Mallampati: II  TM distance: >3 FB  Neck ROM: full  no difficulty expected  Dental - normal exam     Pulmonary - normal exam   (-) COPD, asthma, not a smoker, lung cancer  Cardiovascular - normal exam  Exercise tolerance: good (4-7 METS)    Rhythm: regular  Rate: normal    (-) hypertension, valvular problems/murmurs, past MI, CAD, dysrhythmias, angina, CHF, cardiac stents, CABG      Neuro/Psych  (-) seizures, TIA, CVA  GI/Hepatic/Renal/Endo    (-) hiatal hernia, GERD, PUD, hepatitis, liver disease, no renal disease, diabetes, GI bleed, no thyroid disorder    Musculoskeletal     Abdominal  - normal exam   Substance History      OB/GYN    (+) Pregnant    Comment: 40wk IUP, scheduled for section in the coming week due to LGA baby.  Arrives tonight in labor with meconium.      Other                      Anesthesia Plan    ASA 2 - emergent     spinal       Anesthetic plan, risks, benefits, and alternatives have been provided, discussed and informed consent has been obtained with: patient and spouse/significant other.    CODE STATUS:    Level Of Support Discussed With: Patient  Code Status (Patient has no pulse and is not breathing): CPR (Attempt to Resuscitate)  Medical Interventions (Patient has pulse or is breathing): Full Support

## 2023-12-08 NOTE — LACTATION NOTE
History of Present Illness: An 80 y.o. female here for follow up. She has an  implantable loop recorder. 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 She has a history of hypertension and dyslipidemia. She is on Coumadin without any bleeding issues. She denies any chest pain, dyspnea, presyncope, syncope, PND, orthopnea or edema. Her major issue is worsening vertigo which clearly appears to be positional.  She recently had a balance test and is scheduled to see ENT.     Impression:   Significant vertigo/balance issues, scheduled to see ENT. Loop recorder has not shown arrhythmias to cause these symptoms. 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 Implantable loop record 7/2015 to monitor for arrhythmias and without any events. Hypertension. Dyslipidemia. The underlying etiology of her embolic event is still unclear but she remains on Coumadin. Given the chance of recurrence and that of stroke,  I would continue it indefinitely. Her device does not show any evidence of atrial fibrillation. She has had some tachy episodes but nothing sustained. I will tentatively see her back in one year. Past Medical History:   Diagnosis Date    History of echocardiogram 04/25/2015    EF 55-60%. No WMA. Gr 1 DDfx. RVSP 30-35 mmHg. Mild-mod TR. 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  Vertigo        Current Outpatient Prescriptions   Medication Sig Dispense Refill    lisinopril (PRINIVIL, ZESTRIL) 40 mg tablet Take 1 Tab by mouth daily. 90 Tab 3    hydrochlorothiazide (HYDRODIURIL) 25 mg tablet Take 1 Tab by mouth daily. 90 Tab 3    warfarin (COUMADIN) 5 mg tablet Take 1 Tab by mouth daily. 90 Tab 3    aspirin 81 mg chewable tablet Take 81 mg by mouth daily.       meclizine (ANTIVERT) 25 mg chewable tablet Take 25 mg by This note was copied from a baby's chart.  P2, T baby-new admission this AM. Mom BF her 1-st child just for 1 month. She is planning on breast and formula feeding. Informed PT that LC is available to help with BF until 2300. Mother reports baby BF well so far and she is also giving formula. Encouraged PT to try BF baby every 2-3 h. or PRN and always to offer breast first and then bottle.  Educated on the importance of stimulation for adequate milk supply.  Mom needs PBP and LC faxed the prescription.She denies any questions. Encouraged to call if needing help.    mouth three (3) times daily as needed. Social History   reports that she has never smoked. She does not have any smokeless tobacco history on file. reports that she does not drink alcohol. Family History  family history is not on file. Review of Systems  Except as stated above include:  Constitutional: Negative for fever, chills and malaise/fatigue. HEENT: No congestion or recent URI. Gastrointestinal: No nausea, vomiting, abdominal pain, bloody stools. Pulmonary:  Negative except as stated above. Cardiac:  Negative except as stated above. Musculoskeletal: Negative except as stated above. Neurological:  No localized symptoms. Skin:  Negative except as stated above. Psych:  No mood swings. Endocrine:  No heat/cold intolerance. PHYSICAL EXAM  BP Readings from Last 3 Encounters:   08/17/17 120/70   08/19/16 126/84   02/19/16 140/80     Pulse Readings from Last 3 Encounters:   08/17/17 81   08/19/16 66   02/19/16 72     Wt Readings from Last 3 Encounters:   08/17/17 60.8 kg (134 lb)   08/19/16 60.8 kg (134 lb)   02/19/16 61.2 kg (135 lb)     General:   Well developed, well groomed. Head/Neck:   No jugular venous distention     No carotid bruits. No evidence of xanthelasma. Lungs:   No respiratory distress. Clear bilaterally. Heart:    Regular rate and rhythm. Normal S1/S2. Palpation of heart with normal point of maximum impulse. No significant murmurs, rubs or gallops. ILR ok. Abdomen:   Soft and nontender. No palpable abdominal mass or bruits. Extremities:   Intact peripheral pulses. No significant edema. Neurological:   Alert and oriented to person, place, time. No focal neurological deficit visually.

## 2023-12-08 NOTE — H&P
[facility]  Obstetric History and Physical    Chief Complaint   Patient presents with    Rupture of Membranes     URSZULA - rupture in of membranes around 0400 with a dark color.       Subjective     Patient is a 28 y.o. female  currently at 40w4d, who presents with spontaneous rupture membranes at 40 weeks 4 days in active labor.  Her pregnancy was complicated by suspected macrosomia on last ultrasound that was performed on 2023 with estimated fetal weight of 9 pounds 6 ounces.  She has not been to the office since that date.  She presents in active labor and had previously been counseled on doing  section due to the size of the baby for safety purposes and to avoid potential shoulder dystocia.  The patient now elects to proceed with  section.  She has had normal fetal movement.  Risks of bleeding, infection, injury to bladder were discussed with the patient.    Her prenatal care is benign.  Her previous obstetric/gynecological history is noted for is non-contributory.    The following portions of the patients history were reviewed and updated as appropriate: current medications, allergies, past medical history, past surgical history, past family history, past social history, and problem list .       Prenatal Information:  Prenatal Results       Initial Prenatal Labs       Test Value Reference Range Date Time    Hemoglobin  9.4 g/dL 12.0 - 15.9 23 0637       9.8 g/dL 12.0 - 15.9 23 1255       11.9 g/dL 11.1 - 15.9 23 1138      ^ 10.8 g/dL 12.0 - 16.0 23    Hematocrit  28.5 % 34.0 - 46.6 23 0637       29.8 % 34.0 - 46.6 23 1255       35.9 % 34.0 - 46.6 23 1138      ^ 31.4 % 36.0 - 46.0 23 2126    Platelets  182 10*3/mm3 140 - 450 23 0637       166 10*3/mm3 140 - 450 23 1255       207 x10E3/uL 150 - 450 23 1138      ^ 185 10*3/uL 140 - 440 236    Rubella IgG  6.83 index Immune >0.99 23 1138    Hepatitis  B SAg  Negative  Negative 06/01/23 1138    Hepatitis C Ab  Non Reactive  Non Reactive 06/01/23 1138    RPR  Non Reactive  Non Reactive 06/01/23 1138    T. Pallidum Ab         ABO  A   10/20/23 1526    Rh  Positive   10/20/23 1526    Antibody Screen  Negative  Negative 06/01/23 1138    HIV  Non Reactive  Non Reactive 06/01/23 1138    Urine Culture  Final report   06/01/23 1105    Gonorrhea  Negative  Negative 10/20/23 1908       Negative  Negative 06/01/23 1108    Chlamydia  Negative  Negative 10/20/23 1908       Negative  Negative 06/01/23 1108    TSH        HgB A1c         Varicella IgG  271 index Immune >165 06/01/23 1138    HgB Electrophoresis         Cystic fibrosis                   Fetal testing        Test Value Reference Range Date Time    NIPT        MSAFP        AFP-4                  2nd and 3rd Trimester       Test Value Reference Range Date Time    Hemoglobin (repeated)  11.6 g/dL 12.0 - 15.9 12/08/23 0515       8.5 g/dL 12.0 - 15.9 10/20/23 1526       8.8 g/dL 12.0 - 15.9 09/05/23 1201    Hematocrit (repeated)  35.2 % 34.0 - 46.6 12/08/23 0515       26.0 % 34.0 - 46.6 10/20/23 1526       26.7 % 34.0 - 46.6 09/05/23 1201    Platelets   132 10*3/mm3 140 - 450 12/08/23 0515       175 10*3/mm3 140 - 450 10/20/23 1526       190 10*3/mm3 140 - 450 09/05/23 1201       182 10*3/mm3 140 - 450 06/30/23 0637       166 10*3/mm3 140 - 450 06/29/23 1255       207 x10E3/uL 150 - 450 06/01/23 1138      ^ 185 10*3/uL 140 - 440 05/20/23 2126    GCT  131 mg/dL 65 - 139 09/05/23 1201    Antibody Screen (repeated)  Negative   10/20/23 1526       Negative  Negative 06/01/23 1138    Third Trimester syphilis screen (repeated)   Non Reactive  Non Reactive 06/01/23 1138    GTT Fasting        GTT 1 Hr        GTT 2 Hr        GTT 3 Hr        Group B Strep  Positive  Negative 11/09/23 1555              Other testing        Test Value Reference Range Date Time    Parvo IgG         CMV IgG                   Drug Screening       Test  Value Reference Range Date Time    Amphetamine Screen  Negative ng/mL Uvyzcs=1208 06/01/23 1105    Barbiturate Screen  Negative ng/mL Jpzpxu=873 06/01/23 1105    Benzodiazepine Screen  Negative ng/mL Cwharx=248 06/01/23 1105    Methadone Screen  Negative ng/mL Lunzam=854 06/01/23 1105    Phencyclidine Screen  Negative ng/mL Cutoff=25 06/01/23 1105    Opiates Screen  Negative ng/mL Prtuyl=670 06/01/23 1105    THC Screen  Negative ng/mL Cutoff=20 06/01/23 1105    Cocaine Screen  Negative ng/mL Jvoilk=920 06/01/23 1105    Propoxyphene Screen  Negative ng/mL Zfuhnh=134 06/01/23 1105    Buprenorphine Screen        Methamphetamine Screen        Oxycodone Screen        Tricyclic Antidepressants Screen                  Legend    ^: Historical                          External Prenatal Results       Pregnancy Outside Results - Transcribed From Office Records - See Scanned Records For Details       Test Value Date Time    ABO  A  10/20/23 1526    Rh  Positive  10/20/23 1526    Antibody Screen  Negative  10/20/23 1526       Negative  06/01/23 1138    Varicella IgG  271 index 06/01/23 1138    Rubella  6.83 index 06/01/23 1138    Hgb  11.6 g/dL 12/08/23 0515       8.5 g/dL 10/20/23 1526       8.8 g/dL 09/05/23 1201       9.4 g/dL 06/30/23 0637       9.8 g/dL 06/29/23 1255       11.9 g/dL 06/01/23 1138      ^ 10.8 g/dL 05/20/23 2126    Hct  35.2 % 12/08/23 0515       26.0 % 10/20/23 1526       26.7 % 09/05/23 1201       28.5 % 06/30/23 0637       29.8 % 06/29/23 1255       35.9 % 06/01/23 1138      ^ 31.4 % 05/20/23 2126    Glucose Fasting GTT       Glucose Tolerance Test 1 hour       Glucose Tolerance Test 3 hour       Gonorrhea (discrete)  Negative  10/20/23 1908       Negative  06/01/23 1108    Chlamydia (discrete)  Negative  10/20/23 1908       Negative  06/01/23 1108    RPR  Non Reactive  06/01/23 1138    VDRL       Syphilis Antibody       HBsAg  Negative  06/01/23 1138    Herpes Simplex Virus PCR       Herpes Simplex VIrus  Culture       HIV  Non Reactive  23 1138    Hep C RNA Quant PCR       Hep C Antibody  Non Reactive  23 1138    AFP       Group B Strep  Positive  23 1555    GBS Susceptibility to Clindamycin       GBS Susceptibility to Erythromycin       Fetal Fibronectin       Genetic Testing, Maternal Blood                 Drug Screening       Test Value Date Time    Urine Drug Screen       Amphetamine Screen  Negative ng/mL 23 1105    Barbiturate Screen  Negative ng/mL 23 1105    Benzodiazepine Screen  Negative ng/mL 23 1105    Methadone Screen  Negative ng/mL 23 1105    Phencyclidine Screen  Negative ng/mL 23 1105    Opiates Screen       THC Screen       Cocaine Screen       Propoxyphene Screen  Negative ng/mL 23 1105    Buprenorphine Screen       Methamphetamine Screen       Oxycodone Screen       Tricyclic Antidepressants Screen                 Legend    ^: Historical                             Past OB History:     OB History    Para Term  AB Living   2 1 1 0 0 1   SAB IAB Ectopic Molar Multiple Live Births   0 0 0 0 0 1      # Outcome Date GA Lbr Sunny/2nd Weight Sex Delivery Anes PTL Lv   2 Current            1 Term 21 39w6d 14:10  00:58 3978 g (8 lb 12.3 oz) F Vag-Spont None N DEEPAK      Birth Comments: scale 4      Name: LAURA GREENFIELD      Apgar1: 6  Apgar5: 9       Past Medical History: Past Medical History:   Diagnosis Date    Anemia     during pregnancy      Past Surgical History History reviewed. No pertinent surgical history.   Family History: No family history on file.   Social History:  reports that she has never smoked. She has never been exposed to tobacco smoke. She has never used smokeless tobacco.   reports that she does not currently use alcohol.   reports no history of drug use.        General ROS: Pertinent items are noted in HPI, all other systems reviewed and negative    Objective       Vital Signs Range for the last 24  hours  Temperature: Temp:  [98.3 °F (36.8 °C)-98.4 °F (36.9 °C)] 98.4 °F (36.9 °C)   Temp Source: Temp src: Oral   BP: BP: (107-136)/(52-72) 136/72   Pulse: Heart Rate:  [97] 97   Respirations: Resp:  [15-16] 15   SPO2: SpO2:  [100 %] 100 %   O2 Amount (l/min):     O2 Devices Device (Oxygen Therapy): room air   Weight: Weight:  [74.4 kg (164 lb)] 74.4 kg (164 lb)     Physical Examination: General appearance - alert, well appearing, and in no distress  Mental status - alert, oriented to person, place, and time  Chest - clear to auscultation, no wheezes, rales or rhonchi, symmetric air entry  Heart - normal rate, regular rhythm, normal S1, S2, no murmurs, rubs, clicks or gallops  Abdomen - soft, nontender, nondistended, no masses or organomegaly  Gravid with fundal height greater than 40 cm  Extremities - peripheral pulses normal, no pedal edema, no clubbing or cyanosis    Presentation: Cephalic   Cervix: Exam by: Method: sterile exam per RN (Shari RN)   Dilation: Cervical Dilation (cm): 6   Effacement: Cervical Effacement: 80%   Station:         Fetal Heart Rate Assessment   Method:     Beats/min:     Baseline:     Variability:     Accels:     Decels:     Tracing Category:       Uterine Assessment   Method:     Frequency (min):     Ctx Count in 10 min:     Duration:     Intensity:     Intensity by IUPC:     Resting Tone:     Resting Tone by IUPC:     [place] Units:       Laboratory Results: .  Radiology Review: .  Other Studies: .    Assessment & Plan       * No active hospital problems. *        Assessment:  1.  Intrauterine pregnancy at 40w4d gestation with reactive, reassuring fetal status.    2.  labor  with ROM  3.  Obstetrical history significant for  large for gestational age .  4.  GBS status:   Strep Gp B Culture   Date Value Ref Range Status   11/09/2023 Positive (A) Negative Final     Comment:     Centers for Disease Control and Prevention (CDC) and American Congress  of Obstetricians and Gynecologists  (ACOG) guidelines for prevention of   group B streptococcal (GBS) disease specify co-collection of  a vaginal and rectal swab specimen to maximize sensitivity of GBS  detection. Per the CDC and ACOG, swabbing both the lower vagina and  rectum substantially increases the yield of detection compared with  sampling the vagina alone.  Penicillin G, ampicillin, or cefazolin are indicated for intrapartum  prophylaxis of  GBS colonization. Reflex susceptibility  testing should be performed prior to use of clindamycin only on GBS  isolates from penicillin-allergic women who are considered a high risk  for anaphylaxis. Treatment with vancomycin without additional testing  is warranted if resistance to clindamycin is noted.         Plan:  1. Primary  scheduled  2. Plan of care has been reviewed with patient and she agrees to plan after discussion with hospitalist and with me.  3.  Risks, benefits of treatment plan have been discussed.  4.  All questions have been answered.  5.  .      Elroy Oviedo MD  2023  05:45 EST

## 2023-12-08 NOTE — OP NOTE
"[facility]   Section Operative Note    Pre-Operative Dx:   1.  IUP at Gestational Age: 40w4d  weeks    2.  Fetal macrosomia in labor   Macrosomia    Postoperative dx:    1.  Same     Procedure: Procedure(s):   SECTION PRIMARY   Surgeon/Assistant: Surgeon(s):  Elroy Oviedo MD  First Assistant is Jean Garcia.  Dr. Garcia is necessary for adequate retraction and tissue exposure for hemostatic purposes and adequate fundal pressure.       Anesthesia:  Anesthesiologist: Choice  Anesthesiologist: Elroy Barba MD  CRNA: Petros Hauser CRNA     EBL: 600  mls.   600 cc       QBL:     Pending     IV Fluids: . mls.   UOP: . mls.    I/O this shift:  In: 250 [IV Piggyback:250]  Out: -    Antibiotics: cefazolin (Ancef)     Infant:      Name:  .         Gender: female  infant    Weight: 4160 g (9 lb 2.7 oz)     Apgars: 9  @ 1 minute /     9  @ 5 minutes    Cord gases: Venous:  No results found for: \"PHCVEN\", \"BECVEN\"     Arterial:  No results found for: \"PHCART\", \"BECART\"     Indication for C/Section:  Macrosomia         Priority for C/Section:  routine      Procedure Details:   Patient is presenting in labor and desires primary  for fetal macrosomia suspected.  She is taken aback and adequate level of spinal anesthetic administered.  She is prepped and draped and Yu catheter is placed.  Antibiotic been given and a timeout was performed.  A Pfannenstiel incision was made down to the fascia, the fascia was then incised bilaterally and undermined superiorly and inferiorly.  Rectus muscles divided peritoneum was entered and the bladder flap was then created in the lower uterine segment.  The lower uterine segment is scored and then entered and incision is extended bluntly.  The head is elevated through the incision and delivery occurs with fundal pressure and gentle traction.  Baby is placed on the field for 30 seconds prior to cord clamping.  Cord blood is obtained and placenta is delivered " manually.  Uterus is swept.  The incision is then closed in 2 layers with 0 Vicryl, the second layer being an imbricating layer.  Good uterine tone is noted and good hemostasis is noted.  The rectus muscle was then reapproximated with interrupted 0 chromic.  0 Vicryl was then used to close the fascia in a running nonlocking fashion.  A 3 oh strata fix suture is used to close the skin in a running subcuticular fashion.      Complications:   None      Disposition:   Mother to Mother Baby/Postpartum  in stable condition currently.   Baby to remains with mom  in stable condition currently.       Elroy Oviedo MD  12/8/2023  06:37 EST

## 2023-12-08 NOTE — NURSING NOTE
Patient given discharge instructions and informed on when to return to LD. Patient verbalized understanding and ambulated with belongings off unit.

## 2023-12-08 NOTE — ANESTHESIA POSTPROCEDURE EVALUATION
"Patient: Ian Perez    Procedure Summary       Date: 23 Room / Location:  [facility]    Anesthesia Start: 546 Anesthesia Stop: 644    Procedure:  SECTION PRIMARY (Abdomen) Diagnosis: (LGA/PATIENT CAME IN ROM AND ERNESTO 2023)    Surgeons: Elroy Oviedo MD Provider: Elroy Barba MD    Anesthesia Type: spinal ASA Status: 2 - Emergent            Anesthesia Type: spinal    Vitals  Vitals Value Taken Time   BP 97/61 23 0817   Temp 36.3 °C (97.4 °F) 23 0745   Pulse 69 23 0825   Resp 16 23 0815   SpO2 99 % 23 08           Post Anesthesia Care and Evaluation    Pain management: adequate    Airway patency: patent  Anesthetic complications: No anesthetic complications    Cardiovascular status: acceptable  Respiratory status: acceptable  Hydration status: acceptable    Comments: BP 97/61   Pulse 69   Temp 36.3 °C (97.4 °F) (Oral)   Resp 16   Ht 152.4 cm (60\")   Wt 74.4 kg (164 lb)   LMP 2023   SpO2 99%   Breastfeeding Yes   BMI 32.03 kg/m²       "

## 2023-12-08 NOTE — PLAN OF CARE
Problem: Adult Inpatient Plan of Care  Goal: Plan of Care Review  Outcome: Ongoing, Progressing  Flowsheets (Taken 2023 0809)  Progress: improving  Plan of Care Reviewed With: patient  Outcome Evaluation: pt and baby in rr, stable. Plan transfer to MB  Goal: Patient-Specific Goal (Individualized)  Outcome: Ongoing, Progressing  Goal: Absence of Hospital-Acquired Illness or Injury  Outcome: Ongoing, Progressing  Intervention: Prevent and Manage VTE (Venous Thromboembolism) Risk  Recent Flowsheet Documentation  Taken 2023 0800 by Amparo Reyes RN  VTE Prevention/Management:   bilateral   sequential compression devices on  Taken 2023 0745 by Amparo Reyes RN  VTE Prevention/Management:   bilateral   sequential compression devices on  Taken 2023 0730 by Amparo Reyes RN  VTE Prevention/Management:   bilateral   sequential compression devices on  Taken 2023 0715 by Amparo Reyes RN  VTE Prevention/Management:   bilateral   sequential compression devices on  Taken 2023 0650 by Amparo Reyes RN  VTE Prevention/Management:   bilateral   sequential compression devices on  Goal: Optimal Comfort and Wellbeing  Outcome: Ongoing, Progressing  Goal: Readiness for Transition of Care  Outcome: Ongoing, Progressing     Problem:  Fall Injury Risk  Goal: Absence of Fall, Infant Drop and Related Injury  Outcome: Ongoing, Progressing   Goal Outcome Evaluation:  Plan of Care Reviewed With: patient        Progress: improving  Outcome Evaluation: pt and baby in rr, stable. Plan transfer to MB

## 2023-12-08 NOTE — OUTREACH NOTE
[facility]  IP Postpartum    Questions/Answers      Flowsheet Row Responses   Best Method for Contacting Cell   Support Person Present Yes   Does the patient have a car seat at the hospital No   Delivery Note Reviewed Reviewed   Were birth expectations met? Yes   Is there a need for additional support/resources? No   Lactation Note Reviewed Other   Note Reviewed Other Comment not seen by lactation yet   Is additional support needed? No   Any questions or concerns? No   Is the patient going to use Meds to Beds? Yes   Any concerns related discharge meds/ability to  prescriptions? No   Confirm Postpartum OB appointment --  [reviewed]   Confirm initial well-child Pediatrician appointment date/time: --  [reviewed]   Does patient have transportation to appointments? Yes   Any other assistance needed to ensure she is able to attend appointments? No   Does patient have supplies needed at home for  care? Clothing, Crib, Diapers          Visited patient at the bedside. She is a new transfer from the PACU but feeling good. She is breastfeeding and will ask lactation for help getting a pump. She has most infant supplies but was unable to get a car seat. I will bring her one this afternoon. Reviewed f/u appts for her, infant, and WIC. F/u in one week.     Karena Galvin RN  Maternity Nurse Navigator    2023, 12:03 EST

## 2023-12-08 NOTE — OBED NOTES
URSZULA Note OB        Patient Name: Ian Perez  YOB: 1995  MRN: [id number]  Admission Date: 2023  4:59 AM  Date of Service: 2023    Chief Complaint: Rupture of Membranes (URSZULA - rupture in of membranes around 0400 with a dark color.)        Subjective     Ian Perez is a 28 y.o. female  at 40w4d with Estimated Date of Delivery: 23 who presents with the chief complaint listed above.  She sees Malorie Tucker MD for her prenatal care. Her pregnancy has been complicated by:   suspected fetal macrosomia, polyhydramnios.  Patient now desires delivery by  section .        She describes fetal movement as normal.  She admits to rupture of membranes.  She denies vaginal bleeding. She is feeling contractions.          Objective   Patient Active Problem List    Diagnosis     Palpitations [R00.2]     Acute abdominal pain [R10.9]     Abdominal pain in pregnancy, second trimester [O26.892, R10.9]     Constipation in pregnancy in second trimester [O99.612, K59.00]     Anemia in pregnancy, second trimester [O99.012]     Retained products of conception after delivery without hemorrhage [O73.1]     Abnormal uterine bleeding, postpartum [O72.1]     Maternal anemia in pregnancy, antepartum [O99.019]     UTI (urinary tract infection) during pregnancy [O23.40]     Nausea and vomiting of pregnancy, antepartum [O21.9]         OB History    Para Term  AB Living   2 1 1 0 0 1   SAB IAB Ectopic Molar Multiple Live Births   0 0 0 0 0 1      # Outcome Date GA Lbr Sunny/2nd Weight Sex Delivery Anes PTL Lv   2 Current            1 Term 21 39w6d 14:10 / 00:58 3978 g (8 lb 12.3 oz) F Vag-Spont None N DEEPAK      Birth Comments: scale 4      Name: LAURA GREENFIELD      Apgar1: 6  Apgar5: 9        Past Medical History:   Diagnosis Date    Anemia     during pregnancy       No past surgical history on file.    No current facility-administered medications on file prior to  encounter.     Current Outpatient Medications on File Prior to Encounter   Medication Sig Dispense Refill    acetaminophen (TYLENOL) 500 MG tablet Take 1 tablet by mouth Every 6 (Six) Hours As Needed for Mild Pain. 50 tablet 0    acetaminophen (TYLENOL) 500 MG tablet Take 1 tablet by mouth.      cephalexin (KEFLEX) 500 MG capsule Take 1 capsule by mouth 2 (Two) Times a Day. 10 capsule 0    famotidine (PEPCID) 20 MG tablet Take 1 tablet by mouth Daily.      lidocaine (LIDODERM) 5 % Place 1 patch on the skin as directed by provider Daily. Remove & Discard patch within 12 hours or as directed by MD 30 each 0       No Known Allergies    No family history on file.    Social History     Socioeconomic History    Marital status:      Spouse name: Tru Rose    Number of children: 1    Highest education level: Some college, no degree   Tobacco Use    Smoking status: Never     Passive exposure: Never    Smokeless tobacco: Never   Vaping Use    Vaping Use: Never used   Substance and Sexual Activity    Alcohol use: Not Currently    Drug use: Never    Sexual activity: Yes     Partners: Male           Review of Systems   HEENT: negative  Pulmonary: negative  CV: negative  GI: negative  : leaking fluid, contractions  Musculoskeletal: negative    PHYSICAL EXAM:      VITAL SIGNS:  There were no vitals filed for this visit.     FHT'S:                   Baseline:  140 BPM  Variability:  Moderate = 6 - 25 BPM  Accelerations:  15 x 15 accelerations present     Decelerations:  present  Contractions:   present     Interpretation:   Category 2 tracing.  Repetitive variables with spotaneous return to baseline        PHYSICAL EXAM:    General: well developed; well nourished   Heart: Not performed.   Lungs  : breathing is unlabored     Abdomen: soft, non-tender; no masses       Cervix: was checked (by RN): 6 cm / 80 % / -1  Cervical Dilation (cm): 6  Cervical Effacement: 80%  Fetal Station: -1  Cervical Consistency:  soft  Cervical Position: mid-position   Contractions: irregular        Extremities: peripheral pulses normal, no pedal edema, no clubbing or cyanosis      LABS AND TESTING ORDERED:  Uterine and fetal monitoring      LAB RESULTS:    Recent Results (from the past 24 hour(s))   Urinalysis With Culture If Indicated - Urine, Clean Catch    Collection Time: 23  7:05 PM    Specimen: Urine, Clean Catch   Result Value Ref Range    Color, UA Yellow Yellow, Straw    Appearance, UA Cloudy (A) Clear    pH, UA 7.0 5.0 - 8.0    Specific Gravity, UA 1.015 1.005 - 1.030    Glucose, UA Negative Negative    Ketones, UA Negative Negative    Bilirubin, UA Negative Negative    Blood, UA Negative Negative    Protein, UA 30 mg/dL (1+) (A) Negative    Leuk Esterase, UA Large (3+) (A) Negative    Nitrite, UA Negative Negative    Urobilinogen, UA 1.0 E.U./dL 0.2 - 1.0 E.U./dL   Urinalysis, Microscopic Only - Urine, Clean Catch    Collection Time: 23  7:05 PM    Specimen: Urine, Clean Catch   Result Value Ref Range    RBC, UA 0-2 None Seen, 0-2 /HPF    WBC, UA 21-50 (A) None Seen, 0-2 /HPF    Bacteria, UA 4+ (A) None Seen /HPF    Squamous Epithelial Cells, UA 13-20 (A) None Seen, 0-2 /HPF    Hyaline Casts, UA 7-12 None Seen /LPF    Methodology Automated Microscopy        Lab Results   Component Value Date    ABO A 10/20/2023    RH Positive 10/20/2023       Lab Results   Component Value Date    STREPGPB Positive (A) 2023                 Assessment & Plan     ASSESSMENT/PLAN:  Ian Perez is a 28 y.o. female  at 40w4d who presented with: SROM, labor          Final Impression:  Pregnancy at 40w4d  Category 2 tracing  SROM, labor             There were no vitals filed for this visit.    Lab Results   Component Value Date    STREPGPB Positive (A) 2023     Lab Results   Component Value Date    ABO A 10/20/2023    RH Positive 10/20/2023         PLAN:     Admit for primary  section for suspected fetal  macrosomia.  Covering provider notified.    I have spent 10 minutes including face to face time with the patient, greater than 50% in discussion of the diagnosis (counseling) and/or coordination of care.     Natali López MD  12/8/2023  05:20 EST  OB Hospitalist  Phone:  [phone]

## 2023-12-09 PROBLEM — Z98.891 S/P CESAREAN SECTION: Status: ACTIVE | Noted: 2023-12-09

## 2023-12-09 LAB
BACTERIA SPEC AEROBE CULT: NORMAL
BASOPHILS # BLD AUTO: 0.01 10*3/MM3 (ref 0–0.2)
BASOPHILS NFR BLD AUTO: 0.1 % (ref 0–1.5)
DEPRECATED RDW RBC AUTO: 59.9 FL (ref 37–54)
EOSINOPHIL # BLD AUTO: 0.08 10*3/MM3 (ref 0–0.4)
EOSINOPHIL NFR BLD AUTO: 1.1 % (ref 0.3–6.2)
ERYTHROCYTE [DISTWIDTH] IN BLOOD BY AUTOMATED COUNT: 19.7 % (ref 12.3–15.4)
HCT VFR BLD AUTO: 27.2 % (ref 34–46.6)
HGB BLD-MCNC: 9.1 G/DL (ref 12–15.9)
LYMPHOCYTES # BLD AUTO: 1.37 10*3/MM3 (ref 0.7–3.1)
LYMPHOCYTES NFR BLD AUTO: 18.3 % (ref 19.6–45.3)
MCH RBC QN AUTO: 28.5 PG (ref 26.6–33)
MCHC RBC AUTO-ENTMCNC: 33.5 G/DL (ref 31.5–35.7)
MCV RBC AUTO: 85.3 FL (ref 79–97)
MONOCYTES # BLD AUTO: 0.45 10*3/MM3 (ref 0.1–0.9)
MONOCYTES NFR BLD AUTO: 6 % (ref 5–12)
NEUTROPHILS NFR BLD AUTO: 5.54 10*3/MM3 (ref 1.7–7)
NEUTROPHILS NFR BLD AUTO: 73.8 % (ref 42.7–76)
PLATELET # BLD AUTO: 134 10*3/MM3 (ref 140–450)
PMV BLD AUTO: 11.8 FL (ref 6–12)
RBC # BLD AUTO: 3.19 10*6/MM3 (ref 3.77–5.28)
WBC NRBC COR # BLD AUTO: 7.5 10*3/MM3 (ref 3.4–10.8)

## 2023-12-09 PROCEDURE — 25010000002 KETOROLAC TROMETHAMINE PER 15 MG: Performed by: OBSTETRICS & GYNECOLOGY

## 2023-12-09 PROCEDURE — 0503F POSTPARTUM CARE VISIT: CPT | Performed by: OBSTETRICS & GYNECOLOGY

## 2023-12-09 PROCEDURE — 85025 COMPLETE CBC W/AUTO DIFF WBC: CPT | Performed by: OBSTETRICS & GYNECOLOGY

## 2023-12-09 RX ORDER — FERROUS SULFATE 325(65) MG
325 TABLET ORAL
Status: DISCONTINUED | OUTPATIENT
Start: 2023-12-09 | End: 2023-12-11 | Stop reason: HOSPADM

## 2023-12-09 RX ADMIN — OXYCODONE HYDROCHLORIDE 5 MG: 5 TABLET ORAL at 09:00

## 2023-12-09 RX ADMIN — KETOROLAC TROMETHAMINE 15 MG: 15 INJECTION, SOLUTION INTRAMUSCULAR; INTRAVENOUS at 00:53

## 2023-12-09 RX ADMIN — SIMETHICONE 80 MG: 80 TABLET, CHEWABLE ORAL at 16:11

## 2023-12-09 RX ADMIN — DOCUSATE SODIUM 100 MG: 100 CAPSULE, LIQUID FILLED ORAL at 17:19

## 2023-12-09 RX ADMIN — IBUPROFEN 600 MG: 600 TABLET, FILM COATED ORAL at 20:13

## 2023-12-09 RX ADMIN — KETOROLAC TROMETHAMINE 15 MG: 15 INJECTION, SOLUTION INTRAMUSCULAR; INTRAVENOUS at 06:34

## 2023-12-09 RX ADMIN — ACETAMINOPHEN 650 MG: 325 TABLET, FILM COATED ORAL at 11:05

## 2023-12-09 RX ADMIN — ACETAMINOPHEN 1000 MG: 500 TABLET ORAL at 03:17

## 2023-12-09 RX ADMIN — OXYCODONE HYDROCHLORIDE 5 MG: 5 TABLET ORAL at 20:16

## 2023-12-09 RX ADMIN — IBUPROFEN 600 MG: 600 TABLET, FILM COATED ORAL at 13:57

## 2023-12-09 RX ADMIN — OXYCODONE HYDROCHLORIDE 5 MG: 5 TABLET ORAL at 14:22

## 2023-12-09 RX ADMIN — SIMETHICONE 80 MG: 80 TABLET, CHEWABLE ORAL at 09:00

## 2023-12-09 RX ADMIN — ACETAMINOPHEN 650 MG: 325 TABLET, FILM COATED ORAL at 17:19

## 2023-12-09 RX ADMIN — Medication 1 TABLET: at 09:00

## 2023-12-09 RX ADMIN — SIMETHICONE 80 MG: 80 TABLET, CHEWABLE ORAL at 22:11

## 2023-12-09 RX ADMIN — FERROUS SULFATE TAB 325 MG (65 MG ELEMENTAL FE) 325 MG: 325 (65 FE) TAB at 11:05

## 2023-12-09 NOTE — PLAN OF CARE
Goal Outcome Evaluation:           Progress: improving  Outcome Evaluation: VSS. Dressing intact, CDI. Fundus firm and midline, bleeding scant to light. Pt to remove dressing today. Issues resolving pain this afternoon. Pt has been taking motrin, tylenol, and jem 5 for pain. Ice packs provided for incision. Pt complaining of gas pain and reports not being able to pass gas, abdomen distended. Mylicon given this AM and ambulation encouraged. Bowel sounds present, tinkling. Dr. Henning informed of pt inability to pass gas and he wanted me to encourage pt to get up and walk more frequently, no new orders. Breastfeeding well and supplementing with formula. I/O adequate. Continuing to encourage ambulation and offering mylicon.

## 2023-12-09 NOTE — PROGRESS NOTES
Section Progress Note    Assessment & Plan     Status post  section: Doing well postoperatively.     1) postpartum care immediately after delivery : doing well, routine care. Told to take shower and remove dressing. Mild gestational thrombocytopenia - Not intervention at this time.   2) Anemia - Drop from delivery to 9.1 g from 11.6 g - asymptomatic - add oral iron     Rh status: A positive   Rubella: immune  Gender: Female     Subjective     Postpartum Day 1:  Delivery    The patient feels well. The patient denies emotional concerns. Pain is well controlled with current medications. The baby is well.Urinary output is adequate. The patient is ambulating well. The patient is tolerating a normal diet. Patient denies flatus.    Objective     Vital signs in last 24 hours:  Temp:  [97.2 °F (36.2 °C)-98.5 °F (36.9 °C)] 98.3 °F (36.8 °C)  Heart Rate:  [61-89] 86  Resp:  [16] 16  BP: ()/(58-71) 105/66      General:    alert, appears stated age, and cooperative   Bowel Sounds:  active   Lochia:  appropriate   Uterine Fundus:   firm   Incision:  Dressing in situ    DVT Evaluation:  No evidence of DVT seen on physical exam.     Lab Results   Component Value Date    WBC 7.50 2023    HGB 9.1 (L) 2023    HCT 27.2 (L) 2023    MCV 85.3 2023     (L) 2023         Sudheer Henning MD  2023  10:29 EST

## 2023-12-10 PROCEDURE — 63710000001 PROMETHAZINE PER 12.5 MG: Performed by: OBSTETRICS & GYNECOLOGY

## 2023-12-10 PROCEDURE — 0503F POSTPARTUM CARE VISIT: CPT | Performed by: OBSTETRICS & GYNECOLOGY

## 2023-12-10 RX ORDER — BISACODYL 10 MG
10 SUPPOSITORY, RECTAL RECTAL DAILY
Status: DISCONTINUED | OUTPATIENT
Start: 2023-12-10 | End: 2023-12-11 | Stop reason: HOSPADM

## 2023-12-10 RX ORDER — PROMETHAZINE HYDROCHLORIDE 12.5 MG/1
12.5 TABLET ORAL EVERY 6 HOURS PRN
Status: DISCONTINUED | OUTPATIENT
Start: 2023-12-10 | End: 2023-12-11 | Stop reason: HOSPADM

## 2023-12-10 RX ADMIN — ACETAMINOPHEN 650 MG: 325 TABLET, FILM COATED ORAL at 00:01

## 2023-12-10 RX ADMIN — Medication 1 TABLET: at 08:05

## 2023-12-10 RX ADMIN — IBUPROFEN 600 MG: 600 TABLET, FILM COATED ORAL at 02:15

## 2023-12-10 RX ADMIN — ACETAMINOPHEN 650 MG: 325 TABLET, FILM COATED ORAL at 12:18

## 2023-12-10 RX ADMIN — IBUPROFEN 600 MG: 600 TABLET, FILM COATED ORAL at 08:05

## 2023-12-10 RX ADMIN — SIMETHICONE 80 MG: 80 TABLET, CHEWABLE ORAL at 12:18

## 2023-12-10 RX ADMIN — DOCUSATE SODIUM 100 MG: 100 CAPSULE, LIQUID FILLED ORAL at 06:10

## 2023-12-10 RX ADMIN — OXYCODONE HYDROCHLORIDE 5 MG: 5 TABLET ORAL at 04:11

## 2023-12-10 RX ADMIN — PROMETHAZINE HYDROCHLORIDE 12.5 MG: 12.5 TABLET ORAL at 21:11

## 2023-12-10 RX ADMIN — ACETAMINOPHEN 650 MG: 325 TABLET, FILM COATED ORAL at 18:09

## 2023-12-10 RX ADMIN — FERROUS SULFATE TAB 325 MG (65 MG ELEMENTAL FE) 325 MG: 325 (65 FE) TAB at 08:05

## 2023-12-10 RX ADMIN — IBUPROFEN 600 MG: 600 TABLET, FILM COATED ORAL at 14:36

## 2023-12-10 RX ADMIN — DOCUSATE SODIUM 100 MG: 100 CAPSULE, LIQUID FILLED ORAL at 20:18

## 2023-12-10 RX ADMIN — ACETAMINOPHEN 650 MG: 325 TABLET, FILM COATED ORAL at 06:09

## 2023-12-10 RX ADMIN — OXYCODONE HYDROCHLORIDE 5 MG: 5 TABLET ORAL at 00:03

## 2023-12-10 RX ADMIN — OXYCODONE HYDROCHLORIDE 5 MG: 5 TABLET ORAL at 20:18

## 2023-12-10 RX ADMIN — IBUPROFEN 600 MG: 600 TABLET, FILM COATED ORAL at 20:18

## 2023-12-10 RX ADMIN — SIMETHICONE 80 MG: 80 TABLET, CHEWABLE ORAL at 20:18

## 2023-12-10 RX ADMIN — SIMETHICONE 80 MG: 80 TABLET, CHEWABLE ORAL at 04:12

## 2023-12-10 RX ADMIN — BISACODYL 10 MG: 10 SUPPOSITORY RECTAL at 09:49

## 2023-12-10 NOTE — PLAN OF CARE
Goal Outcome Evaluation:  Plan of Care Reviewed With: patient        Progress: improving  Outcome Evaluation: VSS.  Pt up ad alcira and voiding without difficulty.  Fundal assessment and lochia, wnl.  Incision, open to air.  Ambulation encouraged throughout shift and pt did ambulate around nurses' station. Mylicon given q6hrs.  Pt taking scheduled ibuprofen and tylenol and prn jem 5 for pain.

## 2023-12-10 NOTE — PLAN OF CARE
Goal Outcome Evaluation:           Progress: improving  Outcome Evaluation: VSS. Patient able to pass gas this afternoon. Pain controlled with motrin and tylenol. Fundus firm and midline, bleeding scant. Breastfeeding well. Ambulating in concepcion. Voiding spontaneously. No new concerns.

## 2023-12-11 VITALS
BODY MASS INDEX: 32.2 KG/M2 | OXYGEN SATURATION: 96 % | SYSTOLIC BLOOD PRESSURE: 120 MMHG | TEMPERATURE: 98.5 F | HEART RATE: 79 BPM | WEIGHT: 164 LBS | RESPIRATION RATE: 18 BRPM | HEIGHT: 60 IN | DIASTOLIC BLOOD PRESSURE: 75 MMHG

## 2023-12-11 PROCEDURE — 0503F POSTPARTUM CARE VISIT: CPT | Performed by: OBSTETRICS & GYNECOLOGY

## 2023-12-11 RX ORDER — IBUPROFEN 600 MG/1
600 TABLET ORAL EVERY 6 HOURS
Qty: 40 TABLET | Refills: 1 | Status: SHIPPED | OUTPATIENT
Start: 2023-12-11 | End: 2023-12-21

## 2023-12-11 RX ORDER — FERROUS SULFATE 325(65) MG
325 TABLET ORAL
Qty: 60 TABLET | Refills: 1 | Status: SHIPPED | OUTPATIENT
Start: 2023-12-12 | End: 2023-12-21

## 2023-12-11 RX ORDER — OXYCODONE HYDROCHLORIDE 5 MG/1
5 TABLET ORAL EVERY 4 HOURS PRN
Qty: 16 TABLET | Refills: 0 | Status: SHIPPED | OUTPATIENT
Start: 2023-12-11 | End: 2023-12-15

## 2023-12-11 RX ORDER — HYDROCORTISONE 25 MG/G
CREAM TOPICAL 2 TIMES DAILY
Status: DISCONTINUED | OUTPATIENT
Start: 2023-12-11 | End: 2023-12-11 | Stop reason: HOSPADM

## 2023-12-11 RX ADMIN — BISACODYL 10 MG: 10 SUPPOSITORY RECTAL at 09:02

## 2023-12-11 RX ADMIN — ACETAMINOPHEN 650 MG: 325 TABLET, FILM COATED ORAL at 00:01

## 2023-12-11 RX ADMIN — IBUPROFEN 600 MG: 600 TABLET, FILM COATED ORAL at 02:34

## 2023-12-11 RX ADMIN — Medication 1 TABLET: at 09:02

## 2023-12-11 RX ADMIN — BENZOCAINE: 5.6 OINTMENT TOPICAL at 09:28

## 2023-12-11 RX ADMIN — Medication 1 APPLICATION: at 09:28

## 2023-12-11 RX ADMIN — ACETAMINOPHEN 650 MG: 325 TABLET, FILM COATED ORAL at 06:28

## 2023-12-11 RX ADMIN — SIMETHICONE 80 MG: 80 TABLET, CHEWABLE ORAL at 09:28

## 2023-12-11 RX ADMIN — HYDROCORTISONE: 25 CREAM TOPICAL at 09:28

## 2023-12-11 RX ADMIN — IBUPROFEN 600 MG: 600 TABLET, FILM COATED ORAL at 09:02

## 2023-12-11 NOTE — PROGRESS NOTES
"Discharge Planning Assessment  [facility]     Patient Name: Ian Perez  MRN: [id number]  Today's Date: 12/11/2023    Admit Date: 12/8/2023    Plan: Infant may discharge to mother when medically ready. LYNNE Kelly.   Discharge Needs Assessment    No documentation.                  Discharge Plan       Row Name 12/11/23 1257       Plan    Plan Infant may discharge to mother when medically ready. LYNNE Kelly.    Plan Comments Mother: Ian Perez, MRN: [id number]; infant: Jarod \"Albina\" Ana, MRN: [id number]. CSW consulted for \"[place] score of 11.\" Of note, no toxicology screens were ordered for mother or infant as need was not warranted at this time. CSW met with mother at bedside while father of infant/ was in the room. Mother gave consent for father to be present during assessment. Mother verified address, phone number, and insurance. Mother reports [employer] has spoken to her about adding infant to health insurance. Mother reports having a car seat, crib/bassinet, clothes, and diapers for infant. Mother and father have one other child: 2yr old female, who is being cared for by maternal grandparents during hospital stay. Mother reports, maternal grandparents, paternal grandparents, and other family members are available for support as needed. Mother reports infant is following up with the [facility] after discharge; mother is comfortable scheduling appointments for infant and has reliable transportation. Mother is current with WIC and plans to add infant onto her WIC plan following discharge. CSW spoke to mother about PPD/A. Mother denies any SI, HI, or thoughts of self-harm. Mother denies experiencing any symptoms of depression or anxiety. Mother reports \"I feel fine and don't need any help.\" CSW encouraged mother to reach out to her PCP or OB if signs of PPD/A arise or become unmanageable. CSW provided mother with a packet of resources including: WIC, HANDS, " transportation, infant supplies, counseling, online support groups, postpartum mood and anxiety resources, and general community resources. CSW spent time building rapport with mother, and offered validation, support, and encouragement to mother throughout assessment. Mother and father were polite and appropriate, and denied having unmet needs or concerns at this time. CSW will remain available for psychosocial needs during mother's hospitalization. LYNNE Kelly.                  Continued Care and Services - Admitted Since 12/8/2023    Coordination has not been started for this encounter.       Selected Continued Care - Episodes Includes continued care and service providers with selected services from the active episodes listed below      [facility] Episode start date: 8/21/2023   There are no active outsourced providers for this episode.                 Expected Discharge Date and Time       Expected Discharge Date Expected Discharge Time    Dec 11, 2023            Demographic Summary       Row Name 12/11/23 1256       General Information    Admission Type inpatient    Arrived From home    Referral Source nursing    Reason for Consult mental health concerns    General Information Comments [place] score of 11.                   Functional Status       Row Name 12/11/23 1256       Functional Status, IADL    Medications independent    Meal Preparation independent    Housekeeping independent    Laundry independent    Shopping independent       Mental Status    General Appearance WDL WDL       Mental Status Summary    Recent Changes in Mental Status/Cognitive Functioning no changes       Employment/    Employment Status employed part-time    Current or Previous Occupation healthcare    Employment/ Comments [employer]                   Psychosocial       Row Name 12/11/23 1256       Behavior WDL    Behavior WDL WDL       Emotion Mood WDL    Emotion/Mood/Affect WDL WDL        Speech WDL    Speech WDL WDL       Perceptual State WDL    Perceptual State WDL WDL       Thought Process WDL    Thought Process WDL WDL       Intellectual Performance WDL    Intellectual Performance WDL WDL       Coping/Stress    Major Change/Loss/Stressor birth    Patient Personal Strengths future/goal oriented;motivated;positive attitude;strong support system    Sources of Support parent(s);spouse;other family members                   Abuse/Neglect       Row Name 12/11/23 1257       Personal Safety    Physical Signs of Abuse Present no                   Legal    No documentation.                  Substance Abuse       Row Name 12/11/23 1257       Substance Use    Substance Use Comment No toxicology screens were ordered for mother or infant as need was not warranted at this time.                   Patient Forms    No documentation.                     YOBANY Mcknight

## 2023-12-11 NOTE — PLAN OF CARE
Problem: Adult Inpatient Plan of Care  Goal: Plan of Care Review  Outcome: Met  Flowsheets (Taken 2023 1009)  Progress: improving  Plan of Care Reviewed With: patient  Outcome Evaluation: pt vss, pain managed with tylenol and motrin. up ad alcira, breastfeeding well. discharge home today, follow up in 2 weeks.  Goal: Patient-Specific Goal (Individualized)  Outcome: Met  Goal: Absence of Hospital-Acquired Illness or Injury  Outcome: Met  Goal: Optimal Comfort and Wellbeing  Outcome: Met  Intervention: Monitor Pain and Promote Comfort  Recent Flowsheet Documentation  Taken 2023 by Ewelina Wiseman, RN  Pain Management Interventions:   see MAR   quiet environment facilitated   pain management plan reviewed with patient/caregiver  Intervention: Provide Person-Centered Care  Recent Flowsheet Documentation  Taken 2023 by Ewelina Wiseman RN  Trust Relationship/Rapport:   care explained   choices provided   emotional support provided   empathic listening provided   questions answered   questions encouraged   reassurance provided   thoughts/feelings acknowledged  Goal: Readiness for Transition of Care  Outcome: Met     Problem:  Fall Injury Risk  Goal: Absence of Fall, Infant Drop and Related Injury  Outcome: Met     Problem: Adjustment to Role Transition (Postpartum Vaginal Delivery)  Goal: Successful Maternal Role Transition  Outcome: Met     Problem: Bleeding (Postpartum Vaginal Delivery)  Goal: Hemostasis  Outcome: Met     Problem: Infection (Postpartum Vaginal Delivery)  Goal: Absence of Infection Signs/Symptoms  Outcome: Met     Problem: Pain (Postpartum Vaginal Delivery)  Goal: Acceptable Pain Control  Outcome: Met  Intervention: Prevent or Manage Pain  Recent Flowsheet Documentation  Taken 2023 by Ewelina Wiseman, RN  Pain Management Interventions:   see MAR   quiet environment facilitated   pain management plan reviewed with patient/caregiver     Problem: Urinary  Retention (Postpartum Vaginal Delivery)  Goal: Effective Urinary Elimination  Outcome: Met   Goal Outcome Evaluation:  Plan of Care Reviewed With: patient        Progress: improving  Outcome Evaluation: pt vss, pain managed with tylenol and motrin. up ad alcira, breastfeeding well. discharge home today, follow up in 2 weeks.

## 2023-12-11 NOTE — DISCHARGE SUMMARY
Date of Discharge:  2023    Discharge Diagnosis: Term     Presenting Problem/History of Present Illness  Active Hospital Problems    Diagnosis  POA    **S/P  section [Z98.891]  Not Applicable      Resolved Hospital Problems   No resolved problems to display.          Hospital Course  Patient is a 28 y.o. female presented with active labor and large for gestational age baby with desire for primary  section.   done on the morning of 2023.  Please see separate history and physical and delivery summary for details.  She did well in the postpartum unit..  She was ambulating, voiding, tolerating regular diet.  Her hemoglobin dropped appropriately from 11.6-9.1 and she was asymptomatic and taking oral iron.  On the morning of 2023, her incision is intact and she is stable for discharge home.    Procedures Performed    Procedure(s):   SECTION PRIMARY  -------------------       Consults:   Consults       Date and Time Order Name Status Description    2023  5:15 AM Inpatient Anesthesiology Consult      2023  5:13 AM Inpatient Consult to Anesthesiology              Pertinent Test Results: labs: Postop hemoglobin 9.1    Condition on Discharge: Stable and improving    Vital Signs  Temp:  [97.9 °F (36.6 °C)-98.5 °F (36.9 °C)] 98.5 °F (36.9 °C)  Heart Rate:  [] 79  Resp:  [16-18] 18  BP: (109-120)/(65-75) 120/75    Physical Exam:   General Appearance: alert, appears stated age, and cooperative  Abdomen: normal bowel sounds, no masses, no hepatomegaly, no splenomegaly, soft non-tender, no guarding, no rebound tenderness, and fundus is nontender and incision is intact  Extremities: moves extremities well, no edema, no cyanosis, and no redness    Discharge Disposition  Home or Self Care    Discharge Medications     Discharge Medications        New Medications        Instructions Start Date   ferrous sulfate 325 (65 FE) MG tablet   325 mg, Oral, Daily With  Breakfast   Start Date: December 12, 2023     ibuprofen 600 MG tablet  Commonly known as: ADVIL,MOTRIN   600 mg, Oral, Every 6 Hours      oxyCODONE 5 MG immediate release tablet  Commonly known as: ROXICODONE   5 mg, Oral, Every 4 Hours PRN             Changes to Medications        Instructions Start Date   acetaminophen 500 MG tablet  Commonly known as: TYLENOL  What changed: Another medication with the same name was removed. Continue taking this medication, and follow the directions you see here.   500 mg, Oral, Every 6 Hours PRN             Continue These Medications        Instructions Start Date   famotidine 20 MG tablet  Commonly known as: PEPCID   20 mg, Oral, Daily      lidocaine 5 %  Commonly known as: LIDODERM   1 patch, Transdermal, Every 24 Hours, Remove & Discard patch within 12 hours or as directed by MD             Stop These Medications      cephalexin 500 MG capsule  Commonly known as: KEFLEX              Discharge Diet:     Activity at Discharge:     Follow-up Appointments  No future appointments.      Test Results Pending at Discharge       Elroy Oviedo MD  12/11/23  10:00 EST    Time:

## 2023-12-11 NOTE — DISCHARGE INSTRUCTIONS
Routine  instructions.  Appointment with Dr. Tucker in 2 weeks for incision check.  No driving for 1 week.

## 2023-12-11 NOTE — PROGRESS NOTES
Section Progress Note    Assessment & Plan     Status post  section: Doing well postoperatively.     Continue current care. Passed gas after suppository this morning. She reports still feeling nauseated; phenergan ordered PRN  Postpartum anemia: HgB 11.6->9.1, asymptomatic.      Rh status:   Lab Results   Component Value Date    RH Positive 2023     Rubella: Immune  Gender: Female    Subjective     Postpartum Day 2:  Delivery    The patient feels okay- reports pain comes and goes. The patient denies emotional concerns. Pain is well controlled with current medications when she takes them. The baby iswell. The patient is ambulating well. The patient is tolerating a normal diet. Patient reports nausea, but has passed flatus.  Has tolerated some solids and some liquids but not yet back to a normal diet.     Objective     Vital signs in last 24 hours:  Temp:  [98.3 °F (36.8 °C)-98.5 °F (36.9 °C)] 98.4 °F (36.9 °C)  Heart Rate:  [] 101  Resp:  [18] 18  BP: ()/(60-71) 109/65      General:    alert, appears stated age, and cooperative   CV: Well perfused   Lungs:  no respiratory distress   Lochia:  appropriate   Uterine Fundus:   firm   Incision:  healing well, no significant drainage, no dehiscence, no significant erythema; diastasis noted   DVT Evaluation:  No evidence of DVT seen on physical exam.     Lab Results   Component Value Date    WBC 7.50 2023    HGB 9.1 (L) 2023    HCT 27.2 (L) 2023    MCV 85.3 2023     (L) 2023         Lina Ortez MD  12/10/2023  20:10 EST

## 2023-12-11 NOTE — LACTATION NOTE
This note was copied from a baby's chart.  P2 term baby. Mom reports breast feeding is going well and she denies any questions or concerns.

## 2023-12-11 NOTE — PLAN OF CARE
Goal Outcome Evaluation:  Plan of Care Reviewed With: patient        Progress: improving  Outcome Evaluation: VSS, pain controlled, c/o nausea improved with phenergan, ambulating without difficulty, bonding with infant

## 2023-12-15 ENCOUNTER — PATIENT OUTREACH (OUTPATIENT)
Dept: LABOR AND DELIVERY | Facility: HOSPITAL | Age: 28
End: 2023-12-15
Payer: COMMERCIAL

## 2023-12-15 NOTE — OUTREACH NOTE
[facility]  Unable to Reach       Questions/Answers      Flowsheet Row Responses   Pending Outreach Postpartum Check-in   Call Attempt First   Outcome Left message   Next Call Attempt Date 12/18/23            Karena Galvin RN  Maternity Nurse Navigator    12/15/2023, 11:59 EST

## 2023-12-18 ENCOUNTER — PATIENT OUTREACH (OUTPATIENT)
Dept: LABOR AND DELIVERY | Facility: HOSPITAL | Age: 28
End: 2023-12-18
Payer: COMMERCIAL

## 2023-12-18 NOTE — PROGRESS NOTES
Chief Complaint   Patient presents with    Routine Prenatal Visit      Ian Perez is a 28 y.o.  at 39w3d who presents for routine prenatal visit. She reports that she has been having irregular contractions.  Denies vaginal bleeding or LOF. She reports active fetal movement. She has brought her  today to discuss recommendations for delivery regarding her baby's size.     /71   Wt 73 kg (161 lb)   LMP 2023   BMI 31.44 kg/m²    Gen: well appearing, NAD   Abd: gravid, nontender  SVE: /-3   See OB Flowsheet    ASSESSMENT:   IUP at 39w3d   Prenatal care in third trimester   Maternal anemia affecting pregnancy- s/p IV iron infusions  Heart palpitations- normal echo this pregnancy  Suspected fetal macrosomia - EFW 4542 g (10 lb 0 oz) on ultrasound today   Mild polyhydramnios - JEAN 20.5 cm, MVP 9.1 cm     PLAN:  Problem list reviewed and updated.   Reviewed expectations of this stage of pregnancy.   Third trimester precautions reviewed including labor signs, monitoring fetal movements.   Discussed ultrasound results that demonstrated BPP 8/8, JEAN 20.5 cm with MVP 9.1 cm - mild polyhydramnios, cephalic presentation, anterior placenta above os.   We had again a long discussion regarding her baby's current size on ultrasound that is estimating to be 4542 g (10 lb 0 oz) and mild polyhydramnios. I recommended a primary  section given the suspected fetal macrosomia and risk to labor dystocia, shoulder dystocia with resulting injury such as brachial plexus injury, anoxic brain injury or even death. Now, the patient does have the right to refuse and attempt a labor process if she so desires understanding that the above risks do exist. She does not know how she would like to proceed right now but will call the clinic tomorrow so she can discuss the choice with her  today. This way I can put her on the schedule ASAP. I reiterated that she needs to answer her phone or call us  because it is very difficult to reach her by phone. She indicated understanding and all questions answered.     Patient Active Problem List    Diagnosis Date Noted    S/P  section 2023    Palpitations 10/10/2023    Acute abdominal pain 2023    Abdominal pain in pregnancy, second trimester 2023    Constipation in pregnancy in second trimester 2023    Anemia in pregnancy, second trimester 2023    Retained products of conception after delivery without hemorrhage 2022     Note Last Updated: 2022     Added automatically from request for surgery [id number]      Abnormal uterine bleeding, postpartum 2022     Note Last Updated: 2022     Added automatically from request for surgery [id number]      Maternal anemia in pregnancy, antepartum 09/15/2021    UTI (urinary tract infection) during pregnancy 2021    Nausea and vomiting of pregnancy, antepartum 2021       No orders of the defined types were placed in this encounter.    Malorie Tucker MD

## 2023-12-18 NOTE — OUTREACH NOTE
[facility]  Postpartum Check-In    Questions/Answers      Flowsheet Row Responses   Visit Setting Telephone   Best Method for Contacting Cell   OB Discharge Note Reviewed  Reviewed   OB Discharge Medications Reviewed  Reviewed    discharged home with mother? Yes   Current Pain Levels 0-10 4   At Rest Pain Levels 0-10 3   Pain level with activity 0-10 4   Acceptable Pain Level 0-10 3   Pain Location Abdomen   Pain Description Incisional   How do you treat your pain? Medications   Verbalized Emotional State Acceptance   Family/Support Network Spouse, Mother   Level of Involvement in Care Supportive, Interactive, Attentive   Do you feel comfortable in your relationship with your baby? Yes   Have members of your household adjusted to your baby? Yes   Is the baby's father supportive and/or involved with the baby? Yes   How does your partner feel about the baby? Happy, Involved   Do you feel safe at home, school and work? Yes   Are you in a relationship with someone who threatens you or hurts you? No   Do you have the resources to keep yourself and your baby healthy and safe? Yes   Lochia (per patient report) None noted   Amount None   Lochia Odor None   Is patient breastfeeding? Yes, not pumping   Not pumping - Left Breast Soft, Nontender   Not pumping - Right Breast Soft, Nontender   Postpartum Depression Screening Education Education Provided   Doctor Appointments: Education Provided   Postpartum Care Education Education Provided   S & S to report Education Provided   Followup Appointments Made Yes   Well Child Visit Appointments Made Yes   Appointment Date 23   Provider/Agency Dr Tucker   Well Child Checkup Provider Name De La Cruz   Well Child Check Up Date: 23   Did you complete the visit? Yes   Were there any specific concerns? No   Umbilical Cord No reported signs or symptoms   Was the baby circumcised? No   Feeding Readiness Cues: Eager, Crying   Infant Feeding Method Breast   Is a  lactation referral indicated? No   Breastfeeding Right   Time breastfeeding left: 10   Time breastfeeding right: 10   Frequency of feedings every 2 hours   Number of wet diapers x 24 hours 7   Last BM x 24 hours 4   Emesis (Unmeasured Occurence) yes, throughout day, adviced to call office   What safe sleep surface is available? Bassinet   Are there stuffed animals, toys, pillows, quilts, blankets, wedges, positioners, bumpers or other loose bedding in the infant's sleeping environment? No   Where does the baby usually sleep? Bassinet   Does the baby ever share a sleep surface with a sibling, adult or pet? No   Does the baby ever share a sleep surface in a bed, couch, recliner or other? No   What position do you place your baby to sleep for naps? Back   What position do you place your baby to sleep at night Back   Are you and/or other caregivers smoking inside or outside the baby's home? No   Is the infant dressed appropiately for the temperature of the home? Yes   Do you use a clean, dry pacifier that is not attached to a string or stuffed animal? Yes            Review of Systems    Most Recent [place]  Depression Scale Score (EPDS)    Performed by a clinician: 8 (2023 10:36 AM)    Pt doing well, she reports that her pain is low and she is not having any vaginal bleeding. She is exclusively breastfeeding and does report an increase in emesis for the infant this weekend. Advised to call peds office and number given over the phone. EPDS 8 over the phone today and discussed at length. She does report that she had PPD after her last delivery. This time she feels like she has better support. She declines therapy at this time but states that she will call me for a referral if needed. PP appt was not scheduled so we did that over the phone and she will go on Thurs. Will send to RN call center.     Karena Galvin RN  Maternity Nurse Navigator    2023, 10:54 EST

## 2023-12-21 ENCOUNTER — POSTPARTUM VISIT (OUTPATIENT)
Dept: OBSTETRICS AND GYNECOLOGY | Facility: CLINIC | Age: 28
End: 2023-12-21
Payer: COMMERCIAL

## 2023-12-21 VITALS
HEIGHT: 60 IN | BODY MASS INDEX: 26.9 KG/M2 | SYSTOLIC BLOOD PRESSURE: 95 MMHG | WEIGHT: 137 LBS | DIASTOLIC BLOOD PRESSURE: 61 MMHG

## 2023-12-21 DIAGNOSIS — Z98.891 S/P CESAREAN SECTION: ICD-10-CM

## 2023-12-21 DIAGNOSIS — Z09 POSTOPERATIVE FOLLOW-UP: Primary | ICD-10-CM

## 2023-12-21 DIAGNOSIS — G89.18 POSTOPERATIVE PAIN: ICD-10-CM

## 2023-12-21 DIAGNOSIS — K59.00 CONSTIPATION, UNSPECIFIED CONSTIPATION TYPE: ICD-10-CM

## 2023-12-21 PROCEDURE — 99024 POSTOP FOLLOW-UP VISIT: CPT | Performed by: STUDENT IN AN ORGANIZED HEALTH CARE EDUCATION/TRAINING PROGRAM

## 2023-12-21 RX ORDER — DOCUSATE SODIUM 100 MG/1
100 CAPSULE, LIQUID FILLED ORAL DAILY
Qty: 60 CAPSULE | Refills: 1 | Status: SHIPPED | OUTPATIENT
Start: 2023-12-21

## 2023-12-21 NOTE — PROGRESS NOTES
"Chief Complaint   Patient presents with    Post-op     Patient here for C/S check        SUBJECTIVE:   Ian Perez is a 29 y.o.  who presents s/p  Primary Low Transverse  Section on 23 at 40w4d for fetal macrosomia. Her infant weighed 9 lb 2.7 oz (4160 g) at birth. Her pregnancy was complicated by maternal anemia, polyhydramnios, heart palpitations, and fetal macrosomia. She reports having constipation with difficulty having a bowel movement and pain with voiding. She denies urinary frequency, urgency, burning, fever or chills. She is not taking any pain medications but does report it hurts in her lower abdomen constantly. Denies mood changes. She is still having light vaginal bleeding. She is breast feeding.     Past Medical History:   Diagnosis Date    Anemia     during pregnancy     Past Surgical History:   Procedure Laterality Date     SECTION N/A 2023    Procedure:  SECTION PRIMARY;  Surgeon: Elroy Oviedo MD;  Location: [facility] DELIVERY;  Service: Obstetrics/Gynecology;  Laterality: N/A;     Social History     Tobacco Use    Smoking status: Never     Passive exposure: Never    Smokeless tobacco: Never   Vaping Use    Vaping Use: Never used   Substance Use Topics    Alcohol use: Not Currently    Drug use: Never     History reviewed. No pertinent family history.    Patient Active Problem List   Diagnosis    UTI (urinary tract infection) during pregnancy    Nausea and vomiting of pregnancy, antepartum    Maternal anemia in pregnancy, antepartum    Retained products of conception after delivery without hemorrhage    Abnormal uterine bleeding, postpartum    Acute abdominal pain    Abdominal pain in pregnancy, second trimester    Constipation in pregnancy in second trimester    Anemia in pregnancy, second trimester    Palpitations    S/P  section        OBJECTIVE:   BP 95/61   Ht 152.4 cm (60\")   Wt 62.1 kg (137 lb)   LMP 2023   BMI 26.76 " kg/m²    Physical Examination:   General appearance - alert, well appearing, and in no distress  Chest - no increased work of breathing, regular respirations   Abdomen - soft, mild lower abdominal tenderness in bilateral lower quadrants and suprapubic region, nondistended, no masses or organomegaly; Incision healing well, no drainage, no erythema, no hernia, no seroma  Pelvic - deferred  Neurological - screening mental status exam normal  Musculoskeletal - no joint tenderness, deformity or swelling  Psychiatric - normal mood and affect    Lab Results   Component Value Date    WBC 7.50 2023    HGB 9.1 (L) 2023    HCT 27.2 (L) 2023    MCV 85.3 2023     (L) 2023        ASSESSMENT:   S/p primary low transverse  section   Constipation   Postoperative pain     PLAN:   The patient is having lower abdominal pain and pain in her lower abdomen when she voids. I suspect that this is related to the incision on her uterus in near proximity to her bladder causing her discomfort. Recommended she take ibuprofen and tylenol as needed for pain and apply heating pad on her lower abdomen.   Her incision is healing well at this time.   In regards to her constipation, I prescribed Colace 100 mg to be taken up to twice a day as needed for constipation but recommended taking daily at this time.   Baby is doing well.  Contraception- TBD   At this time, continue pelvic rest and lifting precautions.  Reviewed last pap smear -21. Will obtain at next postpartum visit.   Return in about 4 weeks (around 2024) for postpartum visit .    Malorie Tucker MD

## 2023-12-26 ENCOUNTER — PATIENT OUTREACH (OUTPATIENT)
Dept: CALL CENTER | Facility: HOSPITAL | Age: 28
End: 2023-12-26
Payer: COMMERCIAL

## 2023-12-26 NOTE — OUTREACH NOTE
Motherhood Connection Survey      Flowsheet Row Responses   [facility] facility patient discharged from? [place]   Week 1 attempt successful? No   Unsuccessful attempts Attempt 1   Reschedule Today              Donna CAR - Licensed Nurse

## 2023-12-26 NOTE — OUTREACH NOTE
Motherhood Connection Survey      Flowsheet Row Responses   [facility] facility patient discharged from? [place]   Week 1 attempt successful? No   Unsuccessful attempts Attempt 2   Reschedule Tomorrow              Donna CAR - Licensed Nurse

## 2023-12-27 ENCOUNTER — PATIENT OUTREACH (OUTPATIENT)
Dept: CALL CENTER | Facility: HOSPITAL | Age: 28
End: 2023-12-27
Payer: COMMERCIAL

## 2023-12-27 NOTE — OUTREACH NOTE
Motherhood Connection Survey      Flowsheet Row Responses   [facility]   Week [facility] attempt successful? No   Unsuccessful attempts Attempt 3              Deepthi CAR - Registered Nurse

## 2024-01-18 ENCOUNTER — POSTPARTUM VISIT (OUTPATIENT)
Dept: OBSTETRICS AND GYNECOLOGY | Facility: CLINIC | Age: 29
End: 2024-01-18
Payer: COMMERCIAL

## 2024-01-18 VITALS
WEIGHT: 138 LBS | SYSTOLIC BLOOD PRESSURE: 100 MMHG | HEIGHT: 60 IN | DIASTOLIC BLOOD PRESSURE: 70 MMHG | BODY MASS INDEX: 27.09 KG/M2

## 2024-01-18 DIAGNOSIS — F41.8 POSTPARTUM ANXIETY: ICD-10-CM

## 2024-01-18 DIAGNOSIS — Z12.4 CERVICAL CANCER SCREENING: ICD-10-CM

## 2024-01-18 DIAGNOSIS — Z98.891 S/P CESAREAN SECTION: ICD-10-CM

## 2024-01-18 NOTE — PROGRESS NOTES
"Chief Complaint   Patient presents with    Postpartum Care     Breast and bottle feeding  female [id number]        SUBJECTIVE:   Ian Perez is a 29 y.o.  who presents s/p Primary Low Transverse  Section on 23 at 40w4d for fetal macrosomia. Her infant weighed 9 lb 2.7 oz (4160 g) at birth. Her pregnancy was complicated by maternal anemia, polyhydramnios, heart palpitations, and fetal macrosomia.   She reports doing okay but has been struggling with taking care of the children and feels overwhelmed and anxious. She scored a 14 on the postpartum depression scale today.   Bowel and bladder function have returned to normal.   She reports that she stopped bleeding about a week ago.   She is breast feeding.  She would like the Nexplanon implant for contraception.     Past Medical History:   Diagnosis Date    Anemia     during pregnancy     Past Surgical History:   Procedure Laterality Date     SECTION N/A 2023    Procedure:  SECTION PRIMARY;  Surgeon: Elroy Oviedo MD;  Location: [facility] DELIVERY;  Service: Obstetrics/Gynecology;  Laterality: N/A;     Social History     Tobacco Use    Smoking status: Never     Passive exposure: Never    Smokeless tobacco: Never   Vaping Use    Vaping Use: Never used   Substance Use Topics    Alcohol use: Not Currently    Drug use: Never     History reviewed. No pertinent family history.    Patient Active Problem List   Diagnosis    UTI (urinary tract infection) during pregnancy    Nausea and vomiting of pregnancy, antepartum    Maternal anemia in pregnancy, antepartum    Retained products of conception after delivery without hemorrhage    Abnormal uterine bleeding, postpartum    Acute abdominal pain    Abdominal pain in pregnancy, second trimester    Constipation in pregnancy in second trimester    Anemia in pregnancy, second trimester    Palpitations    S/P  section        OBJECTIVE:   /70   Ht 152.4 cm (60\")   Wt 62.6 " kg (138 lb)   BMI 26.95 kg/m²    Physical Examination:   General appearance - alert, well appearing, and in no distress  Breasts - Examined in supine position  Symmetric without masses or skin dimpling  Nipples normal without inversion, lesions or discharge  There are no palpable axillary nodes  Chest - no tachypnea, retractions or cyanosis  Heart - normal rate and regular rhythm  Abdomen - soft, nontender, nondistended, no masses or organomegaly; Incision healing well, no drainage, no erythema, no hernia, no seroma, no swelling  Pelvic - normal external female genitalia, normal vulva, normal vaginal mucosa, normal appearing cervix, uterus non-tender and normal sized, no adnexal masses or tenderness.   Neurological - screening mental status exam normal  Musculoskeletal - no joint tenderness, deformity or swelling  Psychiatric - normal mood and affect    Lab Results   Component Value Date    WBC 7.50 2023    HGB 9.1 (L) 2023    HCT 27.2 (L) 2023    MCV 85.3 2023     (L) 2023        ASSESSMENT:   S/p primary low transverse  section   Cervical cancer screening  Postpartum anxiety and depression     PLAN:   Patient is healing well from  section but is struggling with postpartum anxiety and depression. She declines initiation of SSRI for management. I discussed option of psychotherapy and provided her with numbers for [facility] and [facility] if she would like to seek therapy and counseling. Advised to contact the office if she notes a worsening in her symptoms or develops SI/HI.   Baby is doing well.   Contraception - Desires Nexplanon insertion and recommended she return in 2 weeks as we do not have a device in office today. She was advised to abstain from intercourse or have only protected intercourse with condoms to her next visit.   At this time, she may resume normal activities including exercise, lifting and sexual  intercourse.   Reviewed last pap smear -2/23/21- NILM. Collected pap smear for cervical cancer screening.   Return in about 2 weeks (around 2/1/2024) for Nexplanon insertion .    Malorie Tucker MD

## 2024-01-22 LAB
CONV .: NORMAL
CYTOLOGIST CVX/VAG CYTO: NORMAL
CYTOLOGY CVX/VAG DOC CYTO: NORMAL
CYTOLOGY CVX/VAG DOC THIN PREP: NORMAL
DX ICD CODE: NORMAL
HIV 1 & 2 AB SER-IMP: NORMAL
OTHER STN SPEC: NORMAL
STAT OF ADQ CVX/VAG CYTO-IMP: NORMAL

## 2024-02-01 ENCOUNTER — OFFICE VISIT (OUTPATIENT)
Dept: OBSTETRICS AND GYNECOLOGY | Facility: CLINIC | Age: 29
End: 2024-02-01
Payer: COMMERCIAL

## 2024-02-01 VITALS
SYSTOLIC BLOOD PRESSURE: 110 MMHG | HEIGHT: 60 IN | BODY MASS INDEX: 28.07 KG/M2 | WEIGHT: 143 LBS | DIASTOLIC BLOOD PRESSURE: 74 MMHG

## 2024-02-01 DIAGNOSIS — Z30.017 NEXPLANON INSERTION: Primary | ICD-10-CM

## 2024-02-01 LAB
B-HCG UR QL: NEGATIVE
EXPIRATION DATE: NORMAL
INTERNAL NEGATIVE CONTROL: NEGATIVE
INTERNAL POSITIVE CONTROL: POSITIVE
Lab: NORMAL

## 2024-02-04 NOTE — PROGRESS NOTES
Nexplanon Insertion:    Pre Dx: 1) Nexplanon Insertion   Post Dx: 1) Nexplanon Insertion     Risks, benefits, alternatives explained. All questions answered. Consents signed.  Patient placed on examined table in supine position with her nondominant right arm flexed at the elbow and hand resting under her head. Nexplanon to be inserted into nondominant arm. The area for insertion prepped with betadine in a sterile fashion. About 3 mL of 1% lidocaine.     The insertion site was identified approximately 8-10 cm proximal to the humoral epicondyle and 3-4 cm below with sulcus of the triceps and biceps muscle. The skin at the insertion site was stretched by my thumb and index finger. Then inserted the needle tip, bevel side up, at an angle not greater than 30° to the skin surface, just until the skin was penetrated to below the bevel. The applicator was then lowered so that it was parallel to the arm. To minimize the chance of deep incision, the skin was tented upwards with the tip of the needle. The needle was then gently inserted to the full length. Then fixed the device in place on the arm with one hand. I then slowly and carefully retracted the needle back by retracting the release lever. The obturator was seen in the device to determine that the nexplanon had been released.     Both the patient and I were easily able to feel the device under the skin. Steri-Strips were applied at the site, and the arm was gently wrapped with gauze.    There were no complications.   The patient tolerated the procedure well.    She was given a reminder card. She was advised to use condoms as a backup method for at least a week after insertion.    Expectations, warning signs and limitations reviewed.     Malorie Tucker MD

## 2025-06-18 ENCOUNTER — TELEPHONE (OUTPATIENT)
Dept: OBSTETRICS AND GYNECOLOGY | Facility: CLINIC | Age: 30
End: 2025-06-18

## 2025-06-18 ENCOUNTER — PROCEDURE VISIT (OUTPATIENT)
Dept: OBSTETRICS AND GYNECOLOGY | Facility: CLINIC | Age: 30
End: 2025-06-18

## 2025-06-18 VITALS — DIASTOLIC BLOOD PRESSURE: 62 MMHG | SYSTOLIC BLOOD PRESSURE: 89 MMHG | WEIGHT: 151 LBS | BODY MASS INDEX: 29.49 KG/M2

## 2025-06-18 DIAGNOSIS — Z97.5 BREAKTHROUGH BLEEDING ON NEXPLANON: Primary | ICD-10-CM

## 2025-06-18 DIAGNOSIS — N92.1 BREAKTHROUGH BLEEDING ON NEXPLANON: Primary | ICD-10-CM

## 2025-06-18 DIAGNOSIS — Z97.5 NEXPLANON IN PLACE: ICD-10-CM

## 2025-06-18 NOTE — TELEPHONE ENCOUNTER
Hub staff attempted to follow warm transfer process and was unsuccessful     Caller: Ian Perez    Relationship to patient: Self    Best call back number: [phone]      Patient is needing: PT RETURNING A MISSED CALL

## 2025-06-29 NOTE — PROGRESS NOTES
Subjective     Chief Complaint   Patient presents with    Procedure     Pt stated she wants to see if her nexplanon is in the correct place. She is concerned because she got a period recently a month ago.       Ian Perez is a 30 y.o.  whose LMP is Patient's last menstrual period was 2025 (approximate). presents with breakthrough bleeding with Nexplanon and wants to ensure it is in the right place. The patient had Nexplanon placed on 24 in her right arm. She reports that recently, she experienced a month of bleeding for which she went to the ED on 25. At that time, she had been bleeding for about 3 weeks. She has not experienced this before with the Nexplanon. She was evaluated with labs and had slightly low hemoglobin of 11.6 and negative pregnancy test. She was advised to follow up with her OB/GYN. She reports that her period has stopped.         The following portions of the patient's history were reviewed and updated as appropriate:vital signs, allergies, current medications, past medical history, past social history, past surgical history, and problem list      Review of Systems   Pertinent items are noted in HPI.     Objective      BP (!) 89/62   Wt 68.5 kg (151 lb)   LMP 2025 (Approximate)   Breastfeeding Yes   BMI 29.49 kg/m²     Physical Exam  Vitals reviewed.   Constitutional:       General: She is not in acute distress.  HENT:      Head: Normocephalic and atraumatic.      Right Ear: External ear normal.      Left Ear: External ear normal.   Eyes:      Extraocular Movements: Extraocular movements intact.      Pupils: Pupils are equal, round, and reactive to light.   Pulmonary:      Effort: Pulmonary effort is normal. No respiratory distress.   Musculoskeletal:         General: No deformity. Normal range of motion.        Arms:    Skin:     General: Skin is warm and dry.   Neurological:      General: No focal deficit present.      Mental Status: She is alert and  oriented to person, place, and time.   Psychiatric:         Mood and Affect: Mood normal.         Behavior: Behavior normal.             Lab Review   Labs: Hgb 11.6/Hct 35.7 on 5/31/25    Imaging   No data reviewed    Assessment & Plan     ASSESSMENT  1. Breakthrough bleeding on Nexplanon    2. Nexplanon in place        PLAN  1. No orders of the defined types were placed in this encounter.      2. Medications prescribed this encounter:      No orders of the defined types were placed in this encounter.      I discussed that breakthrough bleeding is a common side effect that can occur with the Nexplanon and occurs about 1/3 of the time. We discussed options of management if persistent bleeding for weeks was to occur again including hormonal therapy with progestins, estrace, or OCPs vs removal of the Nexplanon and choosing another form of contraception. Nexplanon in the appropriate position in the right arm today. Offered to remove Nexplanon today and discuss other options of contraception but the patient declines and would like to keep the Nexplanon in at this time. Advised the patient to monitor her periods and if prolonged bleeding occurs again to contact the office so we can figure out what she would like to do. All questions and concerns answered. Hopefully, this is a one time event with this device but it could be very well how this device is going to work for rest of its life cycle. Follow up as needed.     Malorie Tucker MD

## (undated) DEVICE — GLV SURG BIOGEL LTX PF 7 1/2

## (undated) DEVICE — SLV SCD CALF HEMOFORCE DVT THERP REPROC MD

## (undated) DEVICE — 3M(TM) TEGADERM(TM) TRANSPARENT FILM DRESSING FRAME STYLE 1627: Brand: 3M™ TEGADERM™

## (undated) DEVICE — SOL IRR H2O BTL 1000ML STRL

## (undated) DEVICE — ANTIBACTERIAL UNDYED BRAIDED (POLYGLACTIN 910), SYNTHETIC ABSORBABLE SUTURE: Brand: COATED VICRYL

## (undated) DEVICE — SUT VIC 0 CTX 36IN J978H

## (undated) DEVICE — SUT GUT CHRM 0 CT 27IN 914H